# Patient Record
Sex: FEMALE | Race: WHITE
[De-identification: names, ages, dates, MRNs, and addresses within clinical notes are randomized per-mention and may not be internally consistent; named-entity substitution may affect disease eponyms.]

---

## 2017-12-28 ENCOUNTER — HOSPITAL ENCOUNTER (INPATIENT)
Dept: HOSPITAL 63 - ER | Age: 67
LOS: 5 days | Discharge: NURSING, REHAB OR LONG-TERM CARE | DRG: 563 | End: 2018-01-02
Attending: FAMILY MEDICINE | Admitting: FAMILY MEDICINE
Payer: MEDICARE

## 2017-12-28 VITALS — WEIGHT: 203 LBS | HEIGHT: 63 IN | BODY MASS INDEX: 35.97 KG/M2

## 2017-12-28 VITALS — SYSTOLIC BLOOD PRESSURE: 95 MMHG | DIASTOLIC BLOOD PRESSURE: 61 MMHG

## 2017-12-28 VITALS — DIASTOLIC BLOOD PRESSURE: 74 MMHG | SYSTOLIC BLOOD PRESSURE: 140 MMHG

## 2017-12-28 VITALS — SYSTOLIC BLOOD PRESSURE: 147 MMHG | DIASTOLIC BLOOD PRESSURE: 89 MMHG

## 2017-12-28 DIAGNOSIS — M19.90: ICD-10-CM

## 2017-12-28 DIAGNOSIS — R55: ICD-10-CM

## 2017-12-28 DIAGNOSIS — E44.0: ICD-10-CM

## 2017-12-28 DIAGNOSIS — Z82.3: ICD-10-CM
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
## 2017-12-28 DIAGNOSIS — Z91.013: ICD-10-CM

## 2017-12-28 DIAGNOSIS — J44.9: ICD-10-CM

## 2017-12-28 DIAGNOSIS — M79.7: ICD-10-CM

## 2017-12-28 DIAGNOSIS — E78.00: ICD-10-CM

## 2017-12-28 DIAGNOSIS — G47.33: ICD-10-CM

## 2017-12-28 DIAGNOSIS — Z91.041: ICD-10-CM

## 2017-12-28 DIAGNOSIS — Z80.9: ICD-10-CM
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
## 2017-12-28 DIAGNOSIS — E66.01: ICD-10-CM

## 2017-12-28 DIAGNOSIS — Z88.4: ICD-10-CM

## 2017-12-28 DIAGNOSIS — I10: ICD-10-CM

## 2017-12-28 DIAGNOSIS — S82.831A: Primary | ICD-10-CM

## 2017-12-28 DIAGNOSIS — Z88.8: ICD-10-CM
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
## 2017-12-28 DIAGNOSIS — D68.51: ICD-10-CM

## 2017-12-28 DIAGNOSIS — Z80.3: ICD-10-CM

## 2017-12-28 LAB
ANION GAP SERPL CALC-SCNC: 9 MMOL/L (ref 6–14)
BASOPHILS # BLD AUTO: 0 X10^3/UL (ref 0–0.2)
BASOPHILS NFR BLD: 0 % (ref 0–3)
CA-I SERPL ISE-MCNC: 24 MG/DL (ref 7–20)
CALCIUM SERPL-MCNC: 8.4 MG/DL (ref 8.5–10.1)
CHLORIDE SERPL-SCNC: 103 MMOL/L (ref 98–107)
CO2 SERPL-SCNC: 25 MMOL/L (ref 21–32)
CREAT SERPL-MCNC: 1.1 MG/DL (ref 0.6–1)
EOSINOPHIL NFR BLD: 0 % (ref 0–3)
EOSINOPHIL NFR BLD: 0 X10^3/UL (ref 0–0.7)
ERYTHROCYTE [DISTWIDTH] IN BLOOD BY AUTOMATED COUNT: 15.2 % (ref 11.5–14.5)
GFR SERPLBLD BASED ON 1.73 SQ M-ARVRAT: 49.8 ML/MIN
GLUCOSE SERPL-MCNC: 95 MG/DL (ref 70–99)
HCT VFR BLD CALC: 38.9 % (ref 36–47)
HGB BLD-MCNC: 12.8 G/DL (ref 12–15.5)
LYMPHOCYTES # BLD: 1.1 X10^3/UL (ref 1–4.8)
LYMPHOCYTES NFR BLD AUTO: 16 % (ref 24–48)
MAGNESIUM SERPL-MCNC: 2 MG/DL (ref 1.8–2.4)
MCH RBC QN AUTO: 25 PG (ref 25–35)
MCHC RBC AUTO-ENTMCNC: 33 G/DL (ref 31–37)
MCV RBC AUTO: 77 FL (ref 79–100)
MONO #: 0.7 X10^3/UL (ref 0–1.1)
MONOCYTES NFR BLD: 9 % (ref 0–9)
NEUT #: 5.5 X10^3UL (ref 1.8–7.7)
NEUTROPHILS NFR BLD AUTO: 75 % (ref 31–73)
PLATELET # BLD AUTO: 245 X10^3/UL (ref 140–400)
POTASSIUM SERPL-SCNC: 4.6 MMOL/L (ref 3.5–5.1)
RBC # BLD AUTO: 5.06 X10^6/UL (ref 3.5–5.4)
SODIUM SERPL-SCNC: 137 MMOL/L (ref 136–145)
WBC # BLD AUTO: 7.3 X10^3/UL (ref 4–11)

## 2017-12-28 PROCEDURE — 80061 LIPID PANEL: CPT

## 2017-12-28 PROCEDURE — G0378 HOSPITAL OBSERVATION PER HR: HCPCS

## 2017-12-28 PROCEDURE — 80177 DRUG SCRN QUAN LEVETIRACETAM: CPT

## 2017-12-28 PROCEDURE — 84443 ASSAY THYROID STIM HORMONE: CPT

## 2017-12-28 PROCEDURE — 93005 ELECTROCARDIOGRAM TRACING: CPT

## 2017-12-28 PROCEDURE — 80048 BASIC METABOLIC PNL TOTAL CA: CPT

## 2017-12-28 PROCEDURE — 93880 EXTRACRANIAL BILAT STUDY: CPT

## 2017-12-28 PROCEDURE — 90732 PPSV23 VACC 2 YRS+ SUBQ/IM: CPT

## 2017-12-28 PROCEDURE — 73610 X-RAY EXAM OF ANKLE: CPT

## 2017-12-28 PROCEDURE — 81001 URINALYSIS AUTO W/SCOPE: CPT

## 2017-12-28 PROCEDURE — 70450 CT HEAD/BRAIN W/O DYE: CPT

## 2017-12-28 PROCEDURE — 87086 URINE CULTURE/COLONY COUNT: CPT

## 2017-12-28 PROCEDURE — 90686 IIV4 VACC NO PRSV 0.5 ML IM: CPT

## 2017-12-28 PROCEDURE — G0379 DIRECT REFER HOSPITAL OBSERV: HCPCS

## 2017-12-28 PROCEDURE — 85025 COMPLETE CBC W/AUTO DIFF WBC: CPT

## 2017-12-28 PROCEDURE — 83735 ASSAY OF MAGNESIUM: CPT

## 2017-12-28 PROCEDURE — 93306 TTE W/DOPPLER COMPLETE: CPT

## 2017-12-28 PROCEDURE — 29515 APPLICATION SHORT LEG SPLINT: CPT

## 2017-12-28 PROCEDURE — 71010: CPT

## 2017-12-28 PROCEDURE — 36415 COLL VENOUS BLD VENIPUNCTURE: CPT

## 2017-12-28 RX ADMIN — DICLOFENAC SODIUM SCH MG: 75 TABLET, DELAYED RELEASE ORAL at 20:14

## 2017-12-28 RX ADMIN — POTASSIUM CHLORIDE SCH MEQ: 1500 TABLET, EXTENDED RELEASE ORAL at 20:13

## 2017-12-28 RX ADMIN — METOPROLOL TARTRATE SCH MG: 50 TABLET, FILM COATED ORAL at 20:12

## 2017-12-28 RX ADMIN — LIDOCAINE SCH PATCH: 50 PATCH CUTANEOUS at 20:16

## 2017-12-28 RX ADMIN — ZOLPIDEM TARTRATE SCH MG: 5 TABLET ORAL at 20:11

## 2017-12-28 NOTE — RAD
Right ankle, 3 views, 12/28/2017:



History: Ankle pain and swelling



The bony structures are demineralized. There is a nondisplaced oblique

fracture of the distal fibula. There is moderate overlying soft tissue

swelling. No other fracture or dislocation is evident.



IMPRESSION: Nondisplaced distal fibular fracture.

## 2017-12-28 NOTE — PHYS DOC
Past History


Past Medical History:  Asthma, High Cholesterol, Other


Past Surgical History:  No Surgical History


Smoking:  Non-smoker


Alcohol Use:  Occasionally


Drug Use:  None





Adult General


Chief Complaint


Chief Complaint:  SYNCOPE





[facility]


HPI





Patient is a 67 year old F who presents with 2 syncopal episodes and right 

ankle pain. Katina states that last night while reaching overhead to get a 

shirt she had a syncopal episode. She injured her right ankle during this fall. 

She is unsure how long she was unconscious. She was able to get to bed. When 

she woke up this morning she stood and used her walker to walk to the bathroom. 

After approximately 4 steps she had a sensation as if she was going to "pass out

". She did go to the ground without injury however she did not feel that she 

lost consciousness at that time. She does have a history of syncopal episodes 

however they are somewhat distant. Currently she has no symptoms other than 

ankle pain. Her ankle pain is worse with movement and palpation. She is unable 

to bear weight. She has no other associated symptoms. She has no other 

exacerbating or alleviating factors.





Review of Systems


Review of Systems





Constitutional: Denies fever or chills []


Eyes: Denies change in visual acuity, redness, or eye pain []


HENT: Denies nasal congestion or sore throat []


Respiratory: Denies cough or shortness of breath []


Cardiovascular: No additional information not addressed in HPI []


GI: Denies abdominal pain, nausea, vomiting, bloody stools or diarrhea []


: Denies dysuria or hematuria []


Musculoskeletal: Denies back pain


Integument: Denies rash or skin lesions []


Neurologic: Denies headache, focal weakness or sensory changes []


Endocrine: Denies polyuria or polydipsia []





All other systems were reviewed and found to be within normal limits, except as 

documented in this note.





Family History


Family History


No pertinent family medical history reported





Current Medications


Current Medications


Current medications were reviewed





Allergies


Allergies





Allergies








Coded Allergies Type Severity Reaction Last Updated Verified


 


  Iodinated Contrast Media - IV Dye Allergy Severe  5/24/14 Yes


 


  iodine Allergy Severe Anaphylaxis 5/24/14 Yes


 


  vancomycin Allergy Severe  5/24/14 Yes











Physical Exam


Physical Exam





Constitutional: Well developed, well nourished, no acute distress, non-toxic 

appearance. []


HENT: Normocephalic, atraumatic, bilateral external ears normal, oropharynx 

moist, no oral exudates, nose normal. []


Eyes:  EOMI, conjunctiva normal, no discharge. [] 


Neck: Normal range of motion, no tenderness, supple, no stridor. [] 


Cardiovascular:Heart rate regular rhythm, no murmur []


Lungs & Thorax:  Bilateral breath sounds clear to auscultation []


Abdomen: Bowel sounds normal, soft, no tenderness, no masses, no pulsatile 

masses. [] 


Skin: Warm, dry, no erythema, no rash. [] 


Extremities: Diffuse right ankle pain particularly on the lateral malleolus. 

Range of motion limited due to pain. Strength testing limited due to pain.


Neurologic: Alert and oriented X 3, normal motor function, normal sensory 

function, no focal deficits noted. []


Psychologic: Affect normal, judgement normal, mood normal. []





Current Patient Data


Vital Signs





 Vital Signs








  Date Time  Temp Pulse Resp B/P (MAP) Pulse Ox O2 Delivery O2 Flow Rate FiO2


 


12/28/17 11:16 98.2 78 18 126/80 (95) 99 Room Air  








Lab Results





 Laboratory Tests








Test


  12/28/17


10:17


 


White Blood Count


  7.3 x10^3/uL


(4.0-11.0)


 


Red Blood Count


  5.06 x10^6/uL


(3.50-5.40)


 


Hemoglobin


  12.8 g/dL


(12.0-15.5)


 


Hematocrit


  38.9 %


(36.0-47.0)


 


Mean Corpuscular Volume


  77 fL ()


L


 


Mean Corpuscular Hemoglobin 25 pg (25-35)  


 


Mean Corpuscular Hemoglobin


Concent 33 g/dL


(31-37)


 


Red Cell Distribution Width


  15.2 %


(11.5-14.5)  H


 


Platelet Count


  245 x10^3/uL


(140-400)


 


Neutrophils (%) (Auto) 75 % (31-73)  H


 


Lymphocytes (%) (Auto) 16 % (24-48)  L


 


Monocytes (%) (Auto) 9 % (0-9)  


 


Eosinophils (%) (Auto) 0 % (0-3)  


 


Basophils (%) (Auto) 0 % (0-3)  


 


Neutrophils # (Auto)


  5.5 x10^3uL


(1.8-7.7)


 


Lymphocytes # (Auto)


  1.1 x10^3/uL


(1.0-4.8)


 


Monocytes # (Auto)


  0.7 x10^3/uL


(0.0-1.1)


 


Eosinophils # (Auto)


  0.0 x10^3/uL


(0.0-0.7)


 


Basophils # (Auto)


  0.0 x10^3/uL


(0.0-0.2)


 


Sodium Level


  137 mmol/L


(136-145)


 


Potassium Level


  4.6 mmol/L


(3.5-5.1)


 


Chloride Level


  103 mmol/L


()


 


Carbon Dioxide Level


  25 mmol/L


(21-32)


 


Anion Gap 9 (6-14)  


 


Blood Urea Nitrogen


  24 mg/dL


(7-20)  H


 


Creatinine


  1.1 mg/dL


(0.6-1.0)  H


 


Estimated GFR


(Cockcroft-Gault) 49.8  


 


 


Glucose Level


  95 mg/dL


(70-99)


 


Calcium Level


  8.4 mg/dL


(8.5-10.1)  L


 


Magnesium Level


  2.0 mg/dL


(1.8-2.4)











EKG


EKG


Normal sinus rhythm, normal QRS interval, no ST segment changes  -  Oleg





Radiology/Procedures


Radiology/Procedures


R ankle xray -  oblique distal fibular fracture without displacement





Course & Med Decision Making


Course & Med Decision Making


Pertinent Labs and Imaging studies reviewed. (See chart for details)





Katina is unable to bear weight due to pain that causes syncope. She lives with 

her  however he is unable to help due to his on chronic medical 

conditions. Orthopedics was contacted by phone and recommended placement in a 

cam walker boot with weightbearing as tolerated. Dr. Hernandez was contacted by 

phone.  He recommended admission for further evaluation and management





Dragon Disclaimer


Dragon Disclaimer


This electronic medical record was generated, in whole or in part, using a 

voice recognition dictation system.





Departure


Departure:


Impression:  


 Primary Impression:  


 Syncope


 Additional Impression:  


 Fracture of distal end of right fibula


Disposition:  09 ADMITTED AS INPATIENT


Condition:  STABLE


Referrals:  


KELLY HERNANDEZ MD (PCP)





Problem Qualifiers








 Primary Impression:  


 Syncope


 Syncope type:  unspecified  Qualified Codes:  R55 - Syncope and collapse


 Additional Impression:  


 Fracture of distal end of right fibula


 Encounter type:  initial encounter  Fracture type:  closed  Fracture morphology

:  unspecified fracture morphology  Qualified Codes:  S82.831A - Other fracture 

of upper and lower end of right fibula, initial encounter for closed fracture








KELLY SAMANIEGO MD Dec 28, 2017 12:35

## 2017-12-29 VITALS — SYSTOLIC BLOOD PRESSURE: 87 MMHG | DIASTOLIC BLOOD PRESSURE: 62 MMHG

## 2017-12-29 VITALS — DIASTOLIC BLOOD PRESSURE: 79 MMHG | SYSTOLIC BLOOD PRESSURE: 113 MMHG

## 2017-12-29 VITALS — SYSTOLIC BLOOD PRESSURE: 97 MMHG | DIASTOLIC BLOOD PRESSURE: 59 MMHG

## 2017-12-29 VITALS — SYSTOLIC BLOOD PRESSURE: 102 MMHG | DIASTOLIC BLOOD PRESSURE: 56 MMHG

## 2017-12-29 VITALS — DIASTOLIC BLOOD PRESSURE: 70 MMHG | SYSTOLIC BLOOD PRESSURE: 150 MMHG

## 2017-12-29 VITALS — DIASTOLIC BLOOD PRESSURE: 55 MMHG | SYSTOLIC BLOOD PRESSURE: 96 MMHG

## 2017-12-29 RX ADMIN — ZOLPIDEM TARTRATE SCH MG: 5 TABLET ORAL at 20:39

## 2017-12-29 RX ADMIN — CEFTRIAXONE SODIUM SCH GM: 1 INJECTION, POWDER, FOR SOLUTION INTRAMUSCULAR; INTRAVENOUS at 12:59

## 2017-12-29 RX ADMIN — DICLOFENAC SODIUM SCH MG: 75 TABLET, DELAYED RELEASE ORAL at 20:38

## 2017-12-29 RX ADMIN — Medication SCH MG: at 09:17

## 2017-12-29 RX ADMIN — POTASSIUM CHLORIDE SCH MEQ: 1500 TABLET, EXTENDED RELEASE ORAL at 20:39

## 2017-12-29 RX ADMIN — LIDOCAINE SCH PATCH: 50 PATCH CUTANEOUS at 09:22

## 2017-12-29 RX ADMIN — Medication SCH CAP: at 20:39

## 2017-12-29 RX ADMIN — VITAMIN D, TAB 1000IU (100/BT) SCH UNIT: 25 TAB at 09:21

## 2017-12-29 RX ADMIN — NIACIN SCH MG: 500 TABLET, FILM COATED, EXTENDED RELEASE ORAL at 09:21

## 2017-12-29 RX ADMIN — METOPROLOL TARTRATE SCH MG: 50 TABLET, FILM COATED ORAL at 09:18

## 2017-12-29 RX ADMIN — CITALOPRAM HYDROBROMIDE SCH MG: 20 TABLET ORAL at 10:12

## 2017-12-29 RX ADMIN — DICLOFENAC SODIUM SCH MG: 75 TABLET, DELAYED RELEASE ORAL at 09:21

## 2017-12-29 RX ADMIN — Medication SCH CAP: at 12:59

## 2017-12-29 RX ADMIN — METOPROLOL TARTRATE SCH MG: 50 TABLET, FILM COATED ORAL at 20:40

## 2017-12-29 RX ADMIN — POTASSIUM CHLORIDE SCH MEQ: 1500 TABLET, EXTENDED RELEASE ORAL at 09:17

## 2017-12-29 RX ADMIN — CHOLESTYRAMINE SCH GM: 4 POWDER, FOR SUSPENSION ORAL at 09:21

## 2017-12-29 NOTE — PDOC2
CONSULT


Date of Admission


DATE: 12/29/17 


TIME: 14:38


Reason for Consult:


syncope


Referring Physician:


Dr. Hernandez


Chief Complaint


syncope


Source:  Patient


Problem List


Problems


Medical Problems:


(1) Fracture of distal end of right fibula


Status: Acute  





(2) Syncope


Status: Acute  








History of Present Illness


The patient is a 67-year-old female who was admitted through the emergency room 

after an episode of syncope and a fall. She states that she was reaching to an 

upper level shelf to obtain a item became lightheadedness and fell. She is 

uncertain how long she was unconscious and there were no witnesses. When she 

awoke she reports pain in her right foot but no other symptoms. She states a 

second episode of near syncope followed this. She states that she's had these 

problems for over 10 years. She reports being followed at . She reports a 

history of a normal tilt table and a normal echocardiogram. These episodes 

usually occur when reaching above her head. She is now comfortable. Her EKG 

shows no ischemic changes. Telemetry throughout her hospitalization has shown a 

normal sinus rhythm. X-ray shows a nondisplaced distal fibular fracture. Chest x

-ray shows no acute changes. CT head scan shows no acute changes.


Cardiovascular:  HTN, syncope, hyperipidemia


Pulmonary:  Asthma


Past Surgical History:  No pertinent history


Family History:  Other (no family history of arrhythmias.)


Smoke:  No


ALCOHOL:  occassional


Current Medications





Current Medications


Furosemide (Lasix) 40 mg WEEKLY PO ;  Start 1/4/18 at 09:00


Levetiracetam (Keppra) 125 mg HS PO ;  Start 12/28/17 at 21:00;  Stop 12/28/17 

at 21:00;  Status DC


Levetiracetam (Keppra) 250 mg DAILY08 PO ;  Start 12/29/17 at 08:00;  Stop 12/29 /17 at 08:00;  Status DC


Lidocaine (Lidoderm) 1 patch DAILY TD  Last administered on 12/29/17at 09:22;  

Start 12/28/17 at 18:30


Metoprolol Tartrate (Lopressor) 50 mg BID PO  Last administered on 12/29/17at 09

:18;  Start 12/28/17 at 21:00


Zolpidem Tartrate (Ambien) 5 mg HS PO  Last administered on 12/28/17at 20:11;  

Start 12/28/17 at 21:00


Niacin (Slo-Niacin) 500 mg DAILY PO  Last administered on 12/29/17at 09:21;  

Start 12/29/17 at 09:00


Fish Oil (Fish Oil) 2,000 mg DAILY PO  Last administered on 12/29/17at 09:17;  

Start 12/29/17 at 09:00


Ibuprofen (Motrin) 600 mg TID PO  Last administered on 12/28/17at 20:13;  Start 

12/28/17 at 21:00;  Stop 12/29/17 at 09:40;  Status DC


Info (FLU VACCINE per PROTOCOL) 1 ea PRN 1X  PRN MC PER PROTOCOL;  Start 12/28/ 17 at 19:30;  Status UNV


Pneumococcal Polyvalent Vaccine (Pneumovax 23) 0.5 ml ONCE ONCE VAX IM  Last 

administered on 12/29/17at 09:34;  Start 12/29/17 at 09:00;  Stop 12/29/17 at 09

:01;  Status DC


Influenza Virus Vaccine Quadrival (Fluarix Quad [id number] Syringe) 0.5 ml ONCE 

ONCE VAX IM  Last administered on 12/29/17at 09:37;  Start 12/29/17 at 09:00;  

Stop 12/29/17 at 09:01;  Status DC


Cholestyramine Resin (Questran Light) 4 gm DAILY PO  Last administered on 12/29/ 17at 09:21;  Start 12/29/17 at 09:00


Diclofenac Sodium (Voltaren) 75 mg BID PO  Last administered on 12/29/17at 09:21

;  Start 12/28/17 at 21:00


Levetiracetam (Keppra) 500 mg BID PO  Last administered on 12/29/17at 09:17;  

Start 12/28/17 at 21:00


Potassium Chloride (Klor-Con) 20 meq BID PO  Last administered on 12/29/17at 09:

17;  Start 12/28/17 at 21:00


Vitamin D (Vitamin D3) 1,000 unit DAILY PO  Last administered on 12/29/17at 09:

21;  Start 12/29/17 at 09:00


Ceftriaxone Sodium 1 gm/ Sodium Chloride 50 ml @  100 mls/hr Q24H IV ;  Start 12 /29/17 at 09:45;  Stop 12/29/17 at 09:50;  Status DC


Citalopram Hydrobromide (CeleXA) 20 mg DAILY PO  Last administered on 12/29/ 17at 10:12;  Start 12/29/17 at 10:00


Ceftriaxone Sodium (Rocephin) 1 gm Q24H IVP  Last administered on 12/29/17at 12:

59;  Start 12/29/17 at 10:00


Lactobacillus Rhamnosus (Culturelle) 1 cap BID PO  Last administered on 12/29/ 17at 12:59;  Start 12/29/17 at 12:00





Active Scripts


Active


Reported


Vitamin D3 (Cholecalciferol (Vitamin D3)) 1,000 Unit Capsule 1,000 Unit PO DAILY


Prevalite Packet (Cholestyramine/Aspartame) 4 Gm Powd.pack 4 Gm PO DAILY


Klor-Con M20 (Potassium Chloride) 20 Meq Tab.er.prt 20 Meq PO BID


Keppra (Levetiracetam) 500 Mg Tablet 500 Mg PO BID


Diclofenac Sodium 75 Mg Tablet.dr 75 Mg PO BID


Daypro (Oxaprozin) 600 Mg Tablet 600 Mg PO DAILY


     Indication: Arthritis


     Last Dose: Unknown


     Next Dose: any time


Lasix (Furosemide) 40 Mg Tablet 40 Mg PO WEEKLY


     Indication: Diuretic


     Next dose: one time weekly


Lidoderm (Lidocaine) 700 Mg Adh..patch   


     Indication: pain patch


     Last Dose: 05/25/14


     Next Dose: 05/26/14


Metoprolol Tartrate 50 Mg Tablet 1 Tab PO BID


     Indication: Blood pressure


     Last dose: 05/25/14 in AM


     Next Dose: 05/25 at bedtime


Lovastatin 20 Mg Tablet   


Niacin 500 Mg Tablet 1 Tab PO DAILY


     Indication:


     Last Dose: 05/25/14


     Next Dose: 05/26/14


Fish Oil + Vitamin D-3 Softgel (Om-3/Dha/Epa/Fish Oil/Vit D3) 1 Each Capsule 2 

Tab PO DAILY


     Indication: Heart health


     Last Dose: 05/25/14


     Next Dose: 05/26/14 in AM


     


Ambien (Zolpidem Tartrate) 5 Mg Tablet 1 Tab PO HS


     Indication: sleep med


     Last Dose: 05/24/14 at bedtime


     Next Dose: 05/25/14 at betime


     


Advair 250-50 Diskus (Fluticasone/Salmeterol) 1 Each Disk.w.dev   PRN BID


     Indication: COPD


     Last Dose: unknonw


     Next Dose: anytime as needed twice daily.


Allergies:  


Coded Allergies:  


     Iodinated Contrast- Oral and IV Dye (Verified  Allergy, Severe, 5/24/14)


     iodine (Verified  Allergy, Severe, Anaphylaxis, 5/24/14)


     vancomycin (Verified  Allergy, Severe, 5/24/14)


     bacitracin (Verified  Allergy, Intermediate, 12/28/17)


     levofloxacin (Verified  Allergy, Intermediate, 12/28/17)


     shellfish derived (Verified  Allergy, Intermediate, 12/28/17)


General:  YES: Fatigue


Cardiovascular:  yes: Other (syncope and near syncope)


General:  No acute distress


HEENT:  Atraumatic


Lungs:  Clear to auscultation


Heart:  Regular rate


Abdomen:  Normal bowel sounds


VITALS





Vital Signs








  Date Time  Temp Pulse Resp B/P (MAP) Pulse Ox O2 Delivery O2 Flow Rate FiO2


 


12/29/17 11:12 97.7 78 20 87/62 (70) 92   


 


12/29/17 08:29      Room Air  








Labs





Laboratory Tests








Test


  12/28/17


10:17 12/29/17


09:50


 


White Blood Count


  7.3 x10^3/uL


(4.0-11.0) 


 


 


Red Blood Count


  5.06 x10^6/uL


(3.50-5.40) 


 


 


Hemoglobin


  12.8 g/dL


(12.0-15.5) 


 


 


Hematocrit


  38.9 %


(36.0-47.0) 


 


 


Mean Corpuscular Volume 77 fL ()  


 


Mean Corpuscular Hemoglobin 25 pg (25-35)  


 


Mean Corpuscular Hemoglobin


Concent 33 g/dL


(31-37) 


 


 


Red Cell Distribution Width


  15.2 %


(11.5-14.5) 


 


 


Platelet Count


  245 x10^3/uL


(140-400) 


 


 


Neutrophils (%) (Auto) 75 % (31-73)  


 


Lymphocytes (%) (Auto) 16 % (24-48)  


 


Monocytes (%) (Auto) 9 % (0-9)  


 


Eosinophils (%) (Auto) 0 % (0-3)  


 


Basophils (%) (Auto) 0 % (0-3)  


 


Neutrophils # (Auto)


  5.5 x10^3uL


(1.8-7.7) 


 


 


Lymphocytes # (Auto)


  1.1 x10^3/uL


(1.0-4.8) 


 


 


Monocytes # (Auto)


  0.7 x10^3/uL


(0.0-1.1) 


 


 


Eosinophils # (Auto)


  0.0 x10^3/uL


(0.0-0.7) 


 


 


Basophils # (Auto)


  0.0 x10^3/uL


(0.0-0.2) 


 


 


Sodium Level


  137 mmol/L


(136-145) 


 


 


Potassium Level


  4.6 mmol/L


(3.5-5.1) 


 


 


Chloride Level


  103 mmol/L


() 


 


 


Carbon Dioxide Level


  25 mmol/L


(21-32) 


 


 


Anion Gap 9 (6-14)  


 


Blood Urea Nitrogen


  24 mg/dL


(7-20) 


 


 


Creatinine


  1.1 mg/dL


(0.6-1.0) 


 


 


Estimated GFR


(Cockcroft-Gault) 49.8 


  


 


 


Glucose Level


  95 mg/dL


(70-99) 


 


 


Calcium Level


  8.4 mg/dL


(8.5-10.1) 


 


 


Magnesium Level


  2.0 mg/dL


(1.8-2.4) 2.0 mg/dL


(1.8-2.4)








Images


Chest x-ray shows no acute changes.


CT head scan shows no acute changes.


X-ray of the patient's leg shows a nondisplaced distal fibular fracture


Assessment/Plan


1. Syncope. The patient's rhythm has been stable since admission. EKG shows no 

evidence of ischemia. As noted above the patient reports that previous fairly 

extensive workup at  for syncope. She is on multiple medications that suggest 

other possible cardiac or neurological problems. At the present time she is 

comfortable and stable in her bed. We'll continue present treatments and have 

requested old records from . Echocardiogram has been requested. Carotid 

ultrasound has been requested. Outpatient monitoring appears reasonable. The 

patient states she has a follow up at  in the next one to 2 months.





2. Reported hypertension. At this time would continue to monitor and continue 

present medications.





3. Nondisplaced distal fibular fracture. The patient has been placed in a 

support boot.





4. Hyperlipidemia. We'll check lab.





5. History of asthma. No wheezing on examination.





Thank you for allowing us to participate in the care of your patient.


Problems:  











ABBI SERNA MD Dec 29, 2017 14:48

## 2017-12-29 NOTE — EKG
[facility] [address], [place] [address]

Test Date:    2017               Test Time:    09:56:17

Pat Name:     ISSA RODRIGUEZ             Department:   

Patient ID:   [id number]           Room:         122 A

Gender:       F                        Technician:   VICENTE

:          1950               Requested By: KELLY SAMANIEGO

Order Number: [id number]            Reading MD:   Lobo Middleton

                                 Measurements

Intervals                              Axis          

Rate:         78                       P:            53

DC:           160                      QRS:          26

QRSD:         72                       T:            42

QT:           376                                    

QTc:          432                                    

                           Interpretive Statements

SINUS RHYTHM

NORMAL ECG



Electronically Signed On 2018 9:09:27 CST by Lobo Middleton

## 2017-12-29 NOTE — RAD
MR#: [id number]

Account#: [id number]

Accession#: [id number]

Date of Study: 12/29/2017

Ordering Physician: JUVENTINO KUO,

Referring Physician: KELLY TRUJILLO,

Tech: Anjali Carmichael RDMS, NAVIT, RTR





--------------- APPROVED REPORT --------------





Patient Location: IN-PATIENT

Laterality:Bilateral



Indications

Syncope

Gray scale images of the bilateral CCA, ICA and ECA do not reveal any signficant thrombus



Spectral waveforms and color doppler evaluation reveals no high grade stenosis. Normal ICA to CCA rat
ios. 



Critical Notification

Critical Value: No



<Conclusion>

No significant VIC. 

Normal antegrade vertebral velocities. 



Signed by : Darrius Ochoa, 

Electronically Approved : 12/29/2017 14:33:36

## 2017-12-29 NOTE — RAD
CT of the head without contrast, 12/29/2017:



History: Possible seizure



Comparison is made to a study from 6/4/2011. There is moderate cerebral

atrophy. The ventricles are within normal limits in size. There is no shift of

the midline structures. There is no evidence of acute intracranial hemorrhage

or mass effect. There is calcific plaquing of the distal internal carotid

arteries.



IMPRESSION: No acute intracranial abnormality is detected.











PQRS Compliance Statement:



One or more of the following individualized dose reduction techniques were

utilized for this examination:

1. Automated exposure control

2. Adjustment of the mA and/or kV according to patient size

3. Use of iterative reconstruction technique

## 2017-12-29 NOTE — RAD
Portable chest, 12/29/2017:



History: Fever



Comparison is made to a study from 9/26/2012. The heart size and pulmonary

vascularity are normal. There is mild calcific plaquing and tortuosity of the

thoracic aorta. No pulmonary infiltrates are seen. There is no evidence of

pleural fluid.



IMPRESSION: No acute cardiopulmonary abnormality is detected.

## 2017-12-30 VITALS — SYSTOLIC BLOOD PRESSURE: 70 MMHG | DIASTOLIC BLOOD PRESSURE: 53 MMHG

## 2017-12-30 VITALS — SYSTOLIC BLOOD PRESSURE: 116 MMHG | DIASTOLIC BLOOD PRESSURE: 76 MMHG

## 2017-12-30 VITALS — SYSTOLIC BLOOD PRESSURE: 116 MMHG | DIASTOLIC BLOOD PRESSURE: 61 MMHG

## 2017-12-30 VITALS — SYSTOLIC BLOOD PRESSURE: 129 MMHG | DIASTOLIC BLOOD PRESSURE: 54 MMHG

## 2017-12-30 VITALS — SYSTOLIC BLOOD PRESSURE: 114 MMHG | DIASTOLIC BLOOD PRESSURE: 77 MMHG

## 2017-12-30 VITALS — DIASTOLIC BLOOD PRESSURE: 79 MMHG | SYSTOLIC BLOOD PRESSURE: 134 MMHG

## 2017-12-30 VITALS — SYSTOLIC BLOOD PRESSURE: 129 MMHG | DIASTOLIC BLOOD PRESSURE: 79 MMHG

## 2017-12-30 LAB
APTT PPP: YELLOW S
BACTERIA #/AREA URNS HPF: (no result) /HPF
BILIRUB UR QL STRIP: (no result)
CHOLEST SERPL-MCNC: 159 MG/DL (ref 0–200)
CHOLEST/HDLC SERPL: 3 {RATIO}
FIBRINOGEN PPP-MCNC: (no result) MG/DL
GLUCOSE UR STRIP-MCNC: (no result) MG/DL
HDLC SERPL-MCNC: 40 MG/DL (ref 40–60)
HYALINE CASTS #/AREA URNS LPF: (no result) /HPF
LDLC: 87 MG/DL (ref 0–100)
NITRITE UR QL STRIP: (no result)
RBC #/AREA URNS HPF: (no result) /HPF (ref 0–2)
SP GR UR STRIP: 1.01
SQUAMOUS #/AREA URNS LPF: (no result) /LPF
THYROID STIM HORMONE (TSH): 3.18 UIU/ML (ref 0.36–3.74)
TRIGL SERPL-MCNC: 164 MG/DL (ref 0–150)
UROBILINOGEN UR-MCNC: 0.2 MG/DL
VLDLC: 32 MG/DL (ref 0–40)
WBC #/AREA URNS HPF: (no result) /HPF (ref 0–4)
YEAST #/AREA URNS HPF: PRESENT /HPF

## 2017-12-30 RX ADMIN — POTASSIUM CHLORIDE SCH MEQ: 1500 TABLET, EXTENDED RELEASE ORAL at 20:25

## 2017-12-30 RX ADMIN — Medication SCH CAP: at 08:49

## 2017-12-30 RX ADMIN — Medication SCH CAP: at 20:24

## 2017-12-30 RX ADMIN — Medication SCH MG: at 08:46

## 2017-12-30 RX ADMIN — CHOLESTYRAMINE SCH GM: 4 POWDER, FOR SUSPENSION ORAL at 08:47

## 2017-12-30 RX ADMIN — CEFTRIAXONE SODIUM SCH GM: 1 INJECTION, POWDER, FOR SOLUTION INTRAMUSCULAR; INTRAVENOUS at 08:56

## 2017-12-30 RX ADMIN — VITAMIN D, TAB 1000IU (100/BT) SCH UNIT: 25 TAB at 08:45

## 2017-12-30 RX ADMIN — METOPROLOL TARTRATE SCH MG: 50 TABLET, FILM COATED ORAL at 20:25

## 2017-12-30 RX ADMIN — DICLOFENAC SODIUM SCH MG: 75 TABLET, DELAYED RELEASE ORAL at 20:26

## 2017-12-30 RX ADMIN — CITALOPRAM HYDROBROMIDE SCH MG: 20 TABLET ORAL at 08:46

## 2017-12-30 RX ADMIN — DICLOFENAC SODIUM SCH MG: 75 TABLET, DELAYED RELEASE ORAL at 08:50

## 2017-12-30 RX ADMIN — ZOLPIDEM TARTRATE SCH MG: 5 TABLET ORAL at 20:24

## 2017-12-30 RX ADMIN — METOPROLOL TARTRATE SCH MG: 50 TABLET, FILM COATED ORAL at 08:47

## 2017-12-30 RX ADMIN — NIACIN SCH MG: 500 TABLET, FILM COATED, EXTENDED RELEASE ORAL at 08:46

## 2017-12-30 RX ADMIN — POTASSIUM CHLORIDE SCH MEQ: 1500 TABLET, EXTENDED RELEASE ORAL at 08:47

## 2017-12-30 RX ADMIN — LIDOCAINE SCH PATCH: 50 PATCH CUTANEOUS at 08:53

## 2017-12-31 VITALS — DIASTOLIC BLOOD PRESSURE: 85 MMHG | SYSTOLIC BLOOD PRESSURE: 143 MMHG

## 2017-12-31 VITALS — SYSTOLIC BLOOD PRESSURE: 142 MMHG | DIASTOLIC BLOOD PRESSURE: 84 MMHG

## 2017-12-31 VITALS — SYSTOLIC BLOOD PRESSURE: 121 MMHG | DIASTOLIC BLOOD PRESSURE: 62 MMHG

## 2017-12-31 VITALS — DIASTOLIC BLOOD PRESSURE: 78 MMHG | SYSTOLIC BLOOD PRESSURE: 151 MMHG

## 2017-12-31 VITALS — DIASTOLIC BLOOD PRESSURE: 56 MMHG | SYSTOLIC BLOOD PRESSURE: 91 MMHG

## 2017-12-31 LAB
ANION GAP SERPL CALC-SCNC: 10 MMOL/L (ref 6–14)
BASOPHILS # BLD AUTO: 0 X10^3/UL (ref 0–0.2)
BASOPHILS NFR BLD: 0 % (ref 0–3)
CA-I SERPL ISE-MCNC: 21 MG/DL (ref 7–20)
CALCIUM SERPL-MCNC: 8.1 MG/DL (ref 8.5–10.1)
CHLORIDE SERPL-SCNC: 106 MMOL/L (ref 98–107)
CO2 SERPL-SCNC: 23 MMOL/L (ref 21–32)
CREAT SERPL-MCNC: 0.8 MG/DL (ref 0.6–1)
EOSINOPHIL NFR BLD: 0.1 X10^3/UL (ref 0–0.7)
EOSINOPHIL NFR BLD: 3 % (ref 0–3)
ERYTHROCYTE [DISTWIDTH] IN BLOOD BY AUTOMATED COUNT: 15.1 % (ref 11.5–14.5)
GFR SERPLBLD BASED ON 1.73 SQ M-ARVRAT: 72 ML/MIN
GLUCOSE SERPL-MCNC: 97 MG/DL (ref 70–99)
HCT VFR BLD CALC: 36.1 % (ref 36–47)
HGB BLD-MCNC: 12.1 G/DL (ref 12–15.5)
LYMPHOCYTES # BLD: 1.7 X10^3/UL (ref 1–4.8)
LYMPHOCYTES NFR BLD AUTO: 37 % (ref 24–48)
MCH RBC QN AUTO: 26 PG (ref 25–35)
MCHC RBC AUTO-ENTMCNC: 34 G/DL (ref 31–37)
MCV RBC AUTO: 76 FL (ref 79–100)
MONO #: 0.4 X10^3/UL (ref 0–1.1)
MONOCYTES NFR BLD: 8 % (ref 0–9)
NEUT #: 2.4 X10^3UL (ref 1.8–7.7)
NEUTROPHILS NFR BLD AUTO: 51 % (ref 31–73)
PLATELET # BLD AUTO: 227 X10^3/UL (ref 140–400)
POTASSIUM SERPL-SCNC: 4.6 MMOL/L (ref 3.5–5.1)
RBC # BLD AUTO: 4.76 X10^6/UL (ref 3.5–5.4)
SODIUM SERPL-SCNC: 139 MMOL/L (ref 136–145)
WBC # BLD AUTO: 4.6 X10^3/UL (ref 4–11)

## 2017-12-31 RX ADMIN — MIDODRINE HYDROCHLORIDE SCH MG: 2.5 TABLET ORAL at 13:00

## 2017-12-31 RX ADMIN — ZOLPIDEM TARTRATE SCH MG: 5 TABLET ORAL at 20:31

## 2017-12-31 RX ADMIN — CITALOPRAM HYDROBROMIDE SCH MG: 20 TABLET ORAL at 08:24

## 2017-12-31 RX ADMIN — METOPROLOL TARTRATE SCH MG: 50 TABLET, FILM COATED ORAL at 09:00

## 2017-12-31 RX ADMIN — LIDOCAINE SCH PATCH: 50 PATCH CUTANEOUS at 08:25

## 2017-12-31 RX ADMIN — MIDODRINE HYDROCHLORIDE SCH MG: 2.5 TABLET ORAL at 13:20

## 2017-12-31 RX ADMIN — MIDODRINE HYDROCHLORIDE SCH MG: 2.5 TABLET ORAL at 15:08

## 2017-12-31 RX ADMIN — NIACIN SCH MG: 500 TABLET, FILM COATED, EXTENDED RELEASE ORAL at 08:24

## 2017-12-31 RX ADMIN — CITALOPRAM HYDROBROMIDE SCH MG: 20 TABLET ORAL at 09:00

## 2017-12-31 RX ADMIN — SODIUM CHLORIDE SCH MLS/HR: 0.9 INJECTION, SOLUTION INTRAVENOUS at 12:15

## 2017-12-31 RX ADMIN — POTASSIUM CHLORIDE SCH MEQ: 1500 TABLET, EXTENDED RELEASE ORAL at 08:24

## 2017-12-31 RX ADMIN — CEFTRIAXONE SODIUM SCH GM: 1 INJECTION, POWDER, FOR SOLUTION INTRAMUSCULAR; INTRAVENOUS at 10:43

## 2017-12-31 RX ADMIN — CHOLESTYRAMINE SCH GM: 4 POWDER, FOR SUSPENSION ORAL at 08:25

## 2017-12-31 RX ADMIN — VITAMIN D, TAB 1000IU (100/BT) SCH UNIT: 25 TAB at 08:23

## 2017-12-31 RX ADMIN — DICLOFENAC SODIUM SCH MG: 75 TABLET, DELAYED RELEASE ORAL at 08:25

## 2017-12-31 RX ADMIN — Medication SCH CAP: at 08:24

## 2017-12-31 RX ADMIN — Medication SCH MG: at 08:24

## 2017-12-31 RX ADMIN — DICLOFENAC SODIUM SCH MG: 75 TABLET, DELAYED RELEASE ORAL at 20:32

## 2017-12-31 RX ADMIN — POTASSIUM CHLORIDE SCH MEQ: 1500 TABLET, EXTENDED RELEASE ORAL at 20:31

## 2017-12-31 RX ADMIN — METOPROLOL TARTRATE SCH MG: 50 TABLET, FILM COATED ORAL at 20:32

## 2017-12-31 RX ADMIN — Medication SCH CAP: at 20:30

## 2017-12-31 NOTE — PDOC
SUBJECTIVE:


Patient seen and examined


She reports feeling better.





OBJECTIVE:


Problems:


Problems


Medical Problems:


(1) Fracture of distal end of right fibula


Status: Acute  





(2) Syncope


Status: Acute  





1. Syncope. The patient's rhythm has been stable since admission. EKG shows no 

evidence of ischemia. As noted above the patient reports that previous fairly 

extensive workup at  for syncope. She is on multiple medications that suggest 

other possible cardiac or neurological problems. At the present time she is 

comfortable and stable in her bed. Head CT scan shows no significant changes. 

Carotid ultrasound shows no significant lesions. ECHO has been ordered but has 

been delayed secondary to technical problems. The patient appears clinically 

stable from a cardiac viewpoint. Echocardiogram could be done as an outpatient. 

The patient states she has a follow up at  in the next one to 2 months.





2. Reported hypertension. At this time would continue to monitor and continue 

present medications.





3. Nondisplaced distal fibular fracture. The patient has been placed in a 

support boot.





4. Hyperlipidemia. Continue present medications.





5. History of asthma. No wheezing on examination.


Physical examination.


Chest is clear.


CV. RRR with a I/VI murmur.


Abdomen. Soft without tenderness.


Vital Signs:





Vital Signs








  Date Time  Temp Pulse Resp B/P (MAP) Pulse Ox O2 Delivery O2 Flow Rate FiO2


 


12/31/17 15:10 97.7 82 20 151/78 (102) 96 Room Air  








I & O











Intake and Output 


 


 12/31/17





 07:00


 


Intake Total 800 ml


 


Output Total 400 ml


 


Balance 400 ml


 


 


 


Intake Oral 800 ml


 


Output Urine Total 400 ml


 


# Voids 2


 


# Bowel Movements 2








Labs:





Laboratory Tests








Test


  12/30/17


02:00 12/30/17


15:42 12/31/17


06:52


 


Triglycerides Level


  164 mg/dL


(0-150) 


  


 


 


Cholesterol Level


  159 mg/dL


(0-200) 


  


 


 


LDL Cholesterol, Calculated


  87 mg/dL


(0-100) 


  


 


 


VLDL Cholesterol, Calculated


  32 mg/dL


(0-40) 


  


 


 


Non-HDL Cholesterol Calculated


  119 mg/dL


(0-129) 


  


 


 


HDL Cholesterol


  40 mg/dL


(40-60) 


  


 


 


Cholesterol/HDL Ratio 3.0   


 


Thyroid Stimulating Hormone


(TSH) 3.183 uIU/mL


(0.358-3.740) 


  


 


 


Urine Collection Type  Unknown  


 


Urine Color  Yellow  


 


Urine Clarity  Cloudy  


 


Urine pH  5.0  


 


Urine Specific Gravity  1.015  


 


Urine Protein


  


  30 mg/dl


(NEG-TRACE) 


 


 


Urine Glucose (UA)


  


  Neg mg/dL


(NEG) 


 


 


Urine Ketones (Stick)  15 mg/dL (NEG)  


 


Urine Blood  Neg (NEG)  


 


Urine Nitrite  Neg (NEG)  


 


Urine Bilirubin  Neg (NEG)  


 


Urine Urobilinogen Dipstick


  


  0.2 mg/dL (0.2


mg/dL) 


 


 


Urine Leukocyte Esterase  Small (NEG)  


 


Urine RBC  3-5 /HPF (0-2)  


 


Urine WBC


  


  5-10 /HPF


(0-4) 


 


 


Urine Squamous Epithelial


Cells 


  Many /LPF 


  


 


 


Urine Bacteria


  


  Many /HPF


(0-FEW) 


 


 


Urine Hyaline Casts  Few /HPF  


 


Urine Mucus  Mod /LPF  


 


Urine Yeast  Present /HPF  


 


White Blood Count


  


  


  4.6 x10^3/uL


(4.0-11.0)


 


Red Blood Count


  


  


  4.76 x10^6/uL


(3.50-5.40)


 


Hemoglobin


  


  


  12.1 g/dL


(12.0-15.5)


 


Hematocrit


  


  


  36.1 %


(36.0-47.0)


 


Mean Corpuscular Volume   76 fL () 


 


Mean Corpuscular Hemoglobin   26 pg (25-35) 


 


Mean Corpuscular Hemoglobin


Concent 


  


  34 g/dL


(31-37)


 


Red Cell Distribution Width


  


  


  15.1 %


(11.5-14.5)


 


Platelet Count


  


  


  227 x10^3/uL


(140-400)


 


Neutrophils (%) (Auto)   51 % (31-73) 


 


Lymphocytes (%) (Auto)   37 % (24-48) 


 


Monocytes (%) (Auto)   8 % (0-9) 


 


Eosinophils (%) (Auto)   3 % (0-3) 


 


Basophils (%) (Auto)   0 % (0-3) 


 


Neutrophils # (Auto)


  


  


  2.4 x10^3uL


(1.8-7.7)


 


Lymphocytes # (Auto)


  


  


  1.7 x10^3/uL


(1.0-4.8)


 


Monocytes # (Auto)


  


  


  0.4 x10^3/uL


(0.0-1.1)


 


Eosinophils # (Auto)


  


  


  0.1 x10^3/uL


(0.0-0.7)


 


Basophils # (Auto)


  


  


  0.0 x10^3/uL


(0.0-0.2)


 


Sodium Level


  


  


  139 mmol/L


(136-145)


 


Potassium Level


  


  


  4.6 mmol/L


(3.5-5.1)


 


Chloride Level


  


  


  106 mmol/L


()


 


Carbon Dioxide Level


  


  


  23 mmol/L


(21-32)


 


Anion Gap   10 (6-14) 


 


Blood Urea Nitrogen


  


  


  21 mg/dL


(7-20)


 


Creatinine


  


  


  0.8 mg/dL


(0.6-1.0)


 


Estimated GFR


(Cockcroft-Gault) 


  


  72.0 


 


 


Glucose Level


  


  


  97 mg/dL


(70-99)


 


Calcium Level


  


  


  8.1 mg/dL


(8.5-10.1)








Physical Exam:


Chest: clear.


CV: RRR with a I/VI murmur.


Abdomen:  soft without tenderness.





ASSESSMENT:


As above.











ABBI SERNA MD Dec 31, 2017 16:16

## 2018-01-01 VITALS — DIASTOLIC BLOOD PRESSURE: 61 MMHG | SYSTOLIC BLOOD PRESSURE: 105 MMHG

## 2018-01-01 VITALS — DIASTOLIC BLOOD PRESSURE: 80 MMHG | SYSTOLIC BLOOD PRESSURE: 132 MMHG

## 2018-01-01 VITALS — DIASTOLIC BLOOD PRESSURE: 92 MMHG | SYSTOLIC BLOOD PRESSURE: 183 MMHG

## 2018-01-01 VITALS — DIASTOLIC BLOOD PRESSURE: 100 MMHG | SYSTOLIC BLOOD PRESSURE: 171 MMHG

## 2018-01-01 VITALS — DIASTOLIC BLOOD PRESSURE: 89 MMHG | SYSTOLIC BLOOD PRESSURE: 181 MMHG

## 2018-01-01 VITALS — SYSTOLIC BLOOD PRESSURE: 195 MMHG | DIASTOLIC BLOOD PRESSURE: 78 MMHG

## 2018-01-01 VITALS — SYSTOLIC BLOOD PRESSURE: 225 MMHG | DIASTOLIC BLOOD PRESSURE: 90 MMHG

## 2018-01-01 RX ADMIN — NIACIN SCH MG: 500 TABLET, FILM COATED, EXTENDED RELEASE ORAL at 08:25

## 2018-01-01 RX ADMIN — MIDODRINE HYDROCHLORIDE SCH MG: 2.5 TABLET ORAL at 05:25

## 2018-01-01 RX ADMIN — Medication SCH CAP: at 08:24

## 2018-01-01 RX ADMIN — ZOLPIDEM TARTRATE SCH MG: 5 TABLET ORAL at 20:38

## 2018-01-01 RX ADMIN — CITALOPRAM HYDROBROMIDE SCH MG: 20 TABLET ORAL at 08:26

## 2018-01-01 RX ADMIN — Medication SCH MG: at 08:25

## 2018-01-01 RX ADMIN — MIDODRINE HYDROCHLORIDE SCH MG: 2.5 TABLET ORAL at 12:12

## 2018-01-01 RX ADMIN — LIDOCAINE SCH PATCH: 50 PATCH CUTANEOUS at 08:23

## 2018-01-01 RX ADMIN — BENZONATATE SCH MG: 100 CAPSULE, LIQUID FILLED ORAL at 20:40

## 2018-01-01 RX ADMIN — DICLOFENAC SODIUM SCH MG: 75 TABLET, DELAYED RELEASE ORAL at 08:24

## 2018-01-01 RX ADMIN — CHOLESTYRAMINE SCH GM: 4 POWDER, FOR SUSPENSION ORAL at 08:25

## 2018-01-01 RX ADMIN — CEFTRIAXONE SODIUM SCH GM: 1 INJECTION, POWDER, FOR SOLUTION INTRAMUSCULAR; INTRAVENOUS at 11:17

## 2018-01-01 RX ADMIN — Medication SCH CAP: at 20:39

## 2018-01-01 RX ADMIN — VITAMIN D, TAB 1000IU (100/BT) SCH UNIT: 25 TAB at 08:25

## 2018-01-01 RX ADMIN — DICLOFENAC SODIUM SCH MG: 75 TABLET, DELAYED RELEASE ORAL at 20:39

## 2018-01-01 RX ADMIN — BENZONATATE SCH MG: 100 CAPSULE, LIQUID FILLED ORAL at 15:20

## 2018-01-01 RX ADMIN — METOPROLOL TARTRATE SCH MG: 50 TABLET, FILM COATED ORAL at 08:24

## 2018-01-01 RX ADMIN — LIDOCAINE SCH PATCH: 50 PATCH CUTANEOUS at 12:31

## 2018-01-01 RX ADMIN — SODIUM CHLORIDE SCH MLS/HR: 0.9 INJECTION, SOLUTION INTRAVENOUS at 00:46

## 2018-01-01 RX ADMIN — POTASSIUM CHLORIDE SCH MEQ: 1500 TABLET, EXTENDED RELEASE ORAL at 08:24

## 2018-01-01 RX ADMIN — METOPROLOL TARTRATE SCH MG: 50 TABLET, FILM COATED ORAL at 20:38

## 2018-01-01 RX ADMIN — POTASSIUM CHLORIDE SCH MEQ: 1500 TABLET, EXTENDED RELEASE ORAL at 20:39

## 2018-01-01 NOTE — PN
DATE:  



SUBJECTIVE:  The patient is a 67-year-old female in with a possible seizure

activity as well as a fracture to her right lower leg.  She is resting fairly

comfortably, making fairly good progress overall, but still a lot of pain and

still trying to control.



PHYSICAL EXAMINATION:

VITAL SIGNS:  On evaluating her blood pressure went down as low as 70/53 (NC),

pulse up to 110, afebrile.  Otherwise, the patient is resting fairly

comfortably, making fairly good progress overall.

GENERAL:  The patient is alert and oriented x 3.  Speech spontaneous

appropriate.  Cranial nerves 2-12 grossly intact.

LUNGS:  Diminished, but clear.

CARDIOVASCULAR:  Regular sinus rhythm, S1, S2, without murmur, rub, thrill, or

extra heart sounds.

ABDOMEN:  Soft, nontender.  No rebounding.

EXTREMITIES:  Right lower leg is very painful for her.  She did not want the CAM

walker.  I told her if she did not want to hurt too much, we put her on ankle

splint.  The fracture is low fibular area.  In any case, the patient made good

progress during the rest of her hospitalization and there were no complications

noted.



IMPRESSION:  Fracture to the right lower fibula, hypertension, morbid obesity,

factor V Leiden deficiency.



PLAN:  The patient will continue with physical and occupational therapy.  We

will monitor her blood pressure and make further evaluation on her as indicated.

 She also has mild-to-moderate protein malnutrition.





______________________________

KELLY TRUJILLO MD



DR:  HAILEY/yuridia  JOB#:  [id number] / [id number]

DD:  12/30/2017 12:57  DT:  01/01/2018 15:44

## 2018-01-01 NOTE — CONS
DATE OF CONSULTATION:  12/30/2017



NEUROLOGIC CONSULTATION



REFERRING PHYSICIAN:  Dr. Piyush Hernandez.



REASON FOR CONSULTATION:  Syncope versus seizure.



HISTORY OF PRESENT ILLNESS:  This is a 67-year-old  female who was

admitted to Emergency Room on 12/28/2017 after she presented with a two syncopal

episodes.  According to the patient, the night before admission, the patient

tried to reach overhead in the closet to get a shirt, subsequently she felt "as

going to pass out."  She apparently fell straight down to the floor and she

lost consciousness of unknown period before she called for help.  The patient

likely stayed on the floor of the closet more than seconds.  She did hit her

head and right ankle.  She denies headaches, but the pain in the right ankle

continued.  The patient probably had some bladder incontinence.  She tried to

get up and go to bed, but the next day she tried to stand up and fell and felt

as if she is going to lose consciousness, but she did not.  The patient was

brought to Emergency Room and x-ray of the right ankle and distal lower

extremity revealed evidence of nondisplaced distal fibular fracture.  She denies

chest pain, shortness of breath or palpitation, dysarthria, dysphagia, numbness

or paraesthesia.  Initial nonenhanced head CT scan revealed no evidence of acute

intracranial process.  According to the patient 4 years ago, she had frequent

syncopal episodes.  She had an extensive cardiac workup for that at [facility] [place], which revealed no obvious cardiac etiology; however, she was seen by a

neurologist at [facility] [place] and she was told she possibly had "kind of

seizure."  Since that time, she has been taking Keppra, anticonvulsant, for

questionable undetected seizures.  The patient has not had any syncopal episodes

until recently.



PAST MEDICAL HISTORY:  Significant for lymphedema/lipedema of all upper and

lower extremities, hypertension, orthostatic hypotension, asthma, obstructive

sleep apnea but she does not use a CPAP, diverticulitis, fibromyalgia,

osteoarthritis, degenerative disk disease, history of elbow fracture, history of

von Willebrand disease, high protein C, cellulitis, and possible factor V Leiden

deficiency.



SOCIAL HISTORY:  The patient is .  She denies smoking, alcohol drinking,

or illicit drug use.



FAMILY HISTORY:  Noncontributory.



CURRENT MEDICATIONS:  Torsemide 40 mg weekly, Rocephin 1 gram q.24h., Celexa 20

mg daily, vitamin D3 1000 units daily, cholestyramine resin 4 gram daily, fish

oil 2000 mg daily, niacin 500 mg daily, potassium chloride 20 mEq b.i.d., Keppra

500 mg b.i.d., Voltaren 75 mg b.i.d., Ambien 5 mg at bedtime, Lopressor 50 mg

b.i.d., lidocaine patches daily for pain.



ALLERGIES:  IODINATED CONTRAST, IV DYE, BACITRACIN, IODINE, LEVOFLOXACIN,

SHELLFISH DERIVED, and VANCOMYCIN.



REVIEW OF SYSTEMS:  A 10-point review of system was performed and consistent

with frequent syncopal/near syncopal attacks and as mentioned above in history

of present illness.



PHYSICAL EXAMINATION:

GENERAL:  Moderately obese  female, not in acute distress.

VITAL SIGNS:  Blood pressure 129/79, respiratory rate 18, pulse is 80 and

regular, oxygen saturation is 92% on room air and temperature 98 Fahrenheit.

HEENT:  Normocephalic, atraumatic, otherwise unremarkable.

NECK:  Supple.  Negative for carotid bruit, lymphadenopathy or thyromegaly.

LUNGS:  Clear to A and P.

CARDIOVASCULAR:  Regular rate and rhythm, normal S1, S2.  There is no S3, S4 or

murmur.

ABDOMEN:  Soft.  Bowel sounds positive.

EXTREMITIES:  Positive for lipedema with brownish discoloration, mainly in the

distal lower extremities; however, the patient had a stabilizer on the right

lower extremity below the knee due to recent distal fibular fracture.

NEUROLOGICAL: 

1.  Mental Status:  The patient is alert and oriented x 3.  Speech is fluent. 

There is no language dysfunction.  Memory, judgment, and abstract thinking are

normal.  The patient denies hallucination or delusion.  Cranial nerves:  Visual

fields are full.  The pupils are reactive to light and accommodation.  The

extraocular movements are intact.  There is no nystagmus.  There is no facial

motor or sensory deficit.  Hearing is intact bilaterally.  The palate is

elevated symmetrically.  Sternocleidomastoid muscles are powerful bilaterally. 

The patient shrugs her shoulders symmetrically and protrudes her tongue in the

midline without fasciculation or atrophy.

2.  MOTOR:  No focal muscle bulk was seen.  The tone is normal.  The strength is

4/5 in the lower extremities and 5/5 in the upper extremities.  Sensory

examination revealed normal pinprick and light touch senses throughout.

3.  Deep tendon reflexes are symmetric and hypoactive with absent Achilles

responses.

4.  Gait not assessed at not tested at this time; however, the nursing stated

that the patient is being walking using a walker without difficulties.



DIAGNOSTIC:

1.  Initial nonenhanced CT scan revealed no evidence of acute intracranial

process.

2.  Carotid Doppler study revealed no evidence of acute internal carotid artery

stenosis.  Chest x-ray revealed no evidence of cardiopulmonary process and x-ray

to the right ankle revealed evidence of a nondisplaced distal fibular fracture.



LABORATORY DATA:  CBC revealed white blood cells of 7.3 thousand, hemoglobin

12.8, hematocrit 38.9, platelet count 245,000.  Chemistry revealed sodium of

137, potassium 4.6, chloride 103, CO2 25, BUN is 24, creatinine 1.1, glucose 95,

calcium is 8.4 and magnesium 2.  Lipid profile reveals triglycerides slightly

elevated at 164 with normal cholesterol and LDL at 87 and HDL at 40.  TSH was

normal at 3.18.



IMPRESSION:

1.  Recurrent syncope/re-syncope attacks.

3.  Questionable seizure disorder.

4.  Multiple medical problems includes history of endocarditis, obstructive

sleep apnea, hypertension, hyperlipidemia, orthostatic hypotension,

osteoarthritis, lipedema/lymphedema of the upper extremities and recent

nondisplaced right distal fibular fracture.



RECOMMENDATIONS:

1.  To obtain the medical record from Cardiology service at [facility] [place].

2.  Electroencephalogram, EEG.

3.  Continue with current management initiated by Dr. Hernandez.

4.  Physical therapy evaluation.

5.  Continue with current medications.





______________________________

M MIKE SANCHEZ MD



DR:  AYUSH/yuridia  JOB#:  [id number] / [id number]

DD:  12/30/2017 15:36  DT:  01/01/2018 13:42

## 2018-01-02 VITALS
DIASTOLIC BLOOD PRESSURE: 77 MMHG | SYSTOLIC BLOOD PRESSURE: 122 MMHG | SYSTOLIC BLOOD PRESSURE: 122 MMHG | DIASTOLIC BLOOD PRESSURE: 77 MMHG

## 2018-01-02 VITALS — SYSTOLIC BLOOD PRESSURE: 116 MMHG | DIASTOLIC BLOOD PRESSURE: 71 MMHG

## 2018-01-02 LAB
ANION GAP SERPL CALC-SCNC: 10 MMOL/L (ref 6–14)
CA-I SERPL ISE-MCNC: 14 MG/DL (ref 7–20)
CALCIUM SERPL-MCNC: 8.7 MG/DL (ref 8.5–10.1)
CHLORIDE SERPL-SCNC: 106 MMOL/L (ref 98–107)
CO2 SERPL-SCNC: 26 MMOL/L (ref 21–32)
CREAT SERPL-MCNC: 0.8 MG/DL (ref 0.6–1)
GFR SERPLBLD BASED ON 1.73 SQ M-ARVRAT: 72 ML/MIN
GLUCOSE SERPL-MCNC: 94 MG/DL (ref 70–99)
POTASSIUM SERPL-SCNC: 4.5 MMOL/L (ref 3.5–5.1)
SODIUM SERPL-SCNC: 142 MMOL/L (ref 136–145)

## 2018-01-02 RX ADMIN — Medication SCH MG: at 08:34

## 2018-01-02 RX ADMIN — BENZONATATE SCH MG: 100 CAPSULE, LIQUID FILLED ORAL at 08:35

## 2018-01-02 RX ADMIN — LIDOCAINE SCH PATCH: 50 PATCH CUTANEOUS at 08:38

## 2018-01-02 RX ADMIN — Medication SCH CAP: at 08:34

## 2018-01-02 RX ADMIN — CITALOPRAM HYDROBROMIDE SCH MG: 20 TABLET ORAL at 08:42

## 2018-01-02 RX ADMIN — DICLOFENAC SODIUM SCH MG: 75 TABLET, DELAYED RELEASE ORAL at 08:39

## 2018-01-02 RX ADMIN — VITAMIN D, TAB 1000IU (100/BT) SCH UNIT: 25 TAB at 08:34

## 2018-01-02 RX ADMIN — CHOLESTYRAMINE SCH GM: 4 POWDER, FOR SUSPENSION ORAL at 08:33

## 2018-01-02 RX ADMIN — METOPROLOL TARTRATE SCH MG: 50 TABLET, FILM COATED ORAL at 08:35

## 2018-01-02 RX ADMIN — BENZONATATE SCH MG: 100 CAPSULE, LIQUID FILLED ORAL at 08:57

## 2018-01-02 RX ADMIN — NIACIN SCH MG: 500 TABLET, FILM COATED, EXTENDED RELEASE ORAL at 08:34

## 2018-01-02 RX ADMIN — POTASSIUM CHLORIDE SCH MEQ: 1500 TABLET, EXTENDED RELEASE ORAL at 08:34

## 2018-01-02 NOTE — CARD
MR#: [id number]

Account#: [id number]

Accession#: [id number]

Date of Study: 12/31/2017

Ordering Physician: JUVENTINO KUO, 

Referring Physician: KELLY TRUJILLO 

Tech: Celia Jurado RDCS





--------------- APPROVED REPORT --------------





EXAM: Two-dimensional and M-mode echocardiogram with Doppler and color Doppler.



Other Information 

HR: 78bpm

Rhythm : NSR



INDICATION

Syncope 



RISK FACTORS

Obesity   



2D DIMENSIONS 

Left Atrium(2D)3.6 (1.6-4.0cm)IVSd0.9 (0.7-1.1cm)

Aortic Root(2D)3.0 (2.0-3.7cm)LVDd3.8 (3.9-5.9cm)

LVOT Diameter2.0 (1.8-2.4cm)PWd0.9 (0.7-1.1cm)

LVDs2.4 (2.5-4.0cm)FS (%) 38.1 %

SV43.0 mlLVEF(%)69.0 (>50%)



Aortic Valve

AoV Peak Marshall.151.7cm/sAoV VTI27.0cm

AO Peak GR.9.2mmHgLVOT Peak Marshall.109.9cm/s

LVOT  VTI 18.44cmAO Mean GR.5mmHg

JENNIFER (VMAX)2.28ng1SPT   (VTI)2.21cm2



Mitral Valve

MV E Eywngqra37.2cm/sMV DECEL [id number]

MV A Gxfhatlt44.8cm/sE/A  Ratio0.7



Tricuspid Valve

TR P. Amketexb506xa/sRAP HPMOPPBZ2kyEq

TR Peak Gr.17mmHg



 LEFT VENTRICLE 

The left ventricle is normal size. There is normal left ventricular wall thickness. Left ventricle sy
stolic function is normal. The Ejection Fraction is 55-60%. There is normal LV segmental wall motion.
 Transmitral Doppler flow pattern is Grade I-abnormal relaxation pattern.



 RIGHT VENTRICLE 

The right ventricle is normal size. There is normal right ventricular wall thickness. The right ventr
icular systolic function is normal.



 ATRIA 

The left atrium size is normal. The right atrium size is normal. The interatrial septum is intact wit
h no evidence for an atrial septal defect or patent foramen ovale as noted on 2-D or Doppler imaging.




 AORTIC VALVE 

The aortic valve is normal in structure and function. Doppler and Color Flow revealed trace aortic re
gurgitation. There is no significant aortic valvular stenosis.



 MITRAL VALVE 

The mitral valve is normal in structure and function. There is no mitral valve stenosis. Doppler and 
Color Flow revealed no mitral valve regurgitation noted.



 TRICUSPID VALVE 

The tricuspid valve is normal in structure and function. Doppler and Color Flow revealed trace tricus
pid regurgitation. The PA pressure was estimated at 20 mmHg. There is no tricuspid valve stenosis.



 PULMONIC VALVE 

PV not well visualized. Doppler and Color Flow revealed no pulmonic valvular regurgitation. There is 
no pulmonic valvular stenosis.



 GREAT VESSELS 

The aortic root is normal in size. Normal pulmonary venous flow (Doppler). The IVC is normal in size 
and collapses >50% with inspiration.



 PERICARDIAL EFFUSION 

There is no evidence of significant pericardial effusion.



Critical Notification

Critical Value: No



<Conclusion>

Technically difficult study.

Left ventricle systolic function is normal.

The Ejection Fraction is 55-60%.

There is normal LV segmental wall motion.

Transmitral Doppler flow pattern is Grade I-abnormal relaxation pattern.

Doppler and Color Flow revealed trace tricuspid regurgitation.

The PA pressure was estimated at 20 mmHg.

There is no evidence of significant pericardial effusion.



Signed by : Lobo Middleton, 

Electronically Approved : 01/02/2018 09:02:38

## 2018-01-02 NOTE — PN
DATE:  01/01/2018



SUBJECTIVE:  The patient with syncope, fracture of the right fibular distal. 

The patient is resting fairly comfortably and making fairly good progress. 

Blood pressure was up (NC).  We will give her some Lasix and then go ahead and

apparently she refused to take the midodrine anyway, so we will continue to

monitor her blood pressure and try to get it down.  She was only on one dose of

metoprolol because her blood pressure was 90.  Otherwise, the patient does have

a wet cough.  She is on Rocephin.



OBJECTIVE:

VITAL SIGNS:  Blood pressure presently 180/90, respiratory rate 20, pulse 80,

afebrile.

LUNGS:  Diminished throughout, but clear.

CARDIOVASCULAR:  Regular sinus rhythm.

EXTREMITIES:  Right leg in splint.



PLAN:  We will go over the rehab tomorrow.  Hopefully, get her blood pressure

down, make further evaluation and discontinue fluids.





______________________________

KELLY TRUJILLO MD



DR:  HAILEY/yuridia  JOB#:  [id number] / [id number]

DD:  01/01/2018 15:00  DT:  01/02/2018 02:24

## 2018-01-02 NOTE — PN
DATE:  12/31/2017



SUBJECTIVE:  The patient stated she had a very brief spell yesterday when she

was taking a shower.  The patient was in the wheelchair when she was turned

facing the shower she felt as if she is going to pass out.  The symptom lasted

about a few seconds and recovered by drinking ice water.  She denies any

complete syncopal attacks.  Currently, she stated she has always generalized

mild headaches.  She denies nausea, vomiting, chest pain, shortness of breath or

palpitation.



OBJECTIVE:

GENERAL:  Well-developed, well-nourished white female, not in acute distress.

VITAL SIGNS:  Blood pressure 142/84, respiratory rate 20, pulse is 78,

temperature 98.2, oxygen saturation 94% on room air.

HEENT:  Normocephalic, atraumatic, otherwise unremarkable.

NECK:  Supple.  Negative for carotid bruit, lymphadenopathy or thyromegaly.

LUNGS:  Clear to A and P.

CARDIOVASCULAR:  Regular rate and rhythm, normal S1, S2.

ABDOMEN:  Soft.  Bowel sounds positive.

EXTREMITIES:  Lipedema throughout upper and lower extremities.

NEUROLOGIC:  The patient is alert and oriented x 3.  Speech is fluent.  There is

no language dysfunction.  Memory, judgment, and abstract thinking are normal. 

The patient denies hallucination or delusion.  Cranial nerves are intact.  No

focal motor or sensory deficit.  The strength is 4/5 throughout. The strength in

the right lower extremity is difficult to evaluate because of recent fracture of

right distal fibular on.  Deep tendon reflexes are symmetric and active without

pathologic responses.  Gait not tested.



LABORATORY DATA:  CBC revealed white blood cells of 4.6 thousand, hemoglobin

12.1, hematocrit 36.1 and platelet count 227,000.  Chemistry revealed sodium of

139, potassium 4.6, chloride 106, CO2 of 23, BUN 21, creatinine 0.8, glucose 97.



IMPRESSION:  

1. Recurrent presyncope/syncope.  The patient stated sometimes may emotion

contribute to her symptoms.

2. Questionable underlying seizure.

3. Multiple medical problem includes asthma, obstructive sleep apnea,

osteoarthritis, hypertension, hyperlipidemia, and rule out orthostatic

hypotension.

4. Recent nondisplaced fracture of the right distal fibula.



RECOMMENDATIONS:

1. Continue with current home medications.

2. Continue with current management initiated by Dr. Hernandez.

3. Physical therapy as tolerated.





______________________________

M MIKE SANCHEZ MD



DR:  Rosemary  JOB#:  [id number] / [id number]

DD:  12/31/2017 12:21  DT:  01/01/2018 15:47

## 2018-01-02 NOTE — PN
DATE:  01/01/2018



SUBJECTIVE:  The patient denies any recurrent syncopal spells or seizure-like

activities.  However, she has been refusing taking midodrine because she stated

she is not supposed to take midodrine when she is in positions and because she

is always in bed in supine position, therefore, she has not been taking the

medicine for the last 3 days.  She denies any new medical or neurological

complaints.



OBJECTIVE:

GENERAL:  Moderately obese white female, not in acute distress.

VITAL SIGNS:  Blood pressure 195/78, respiratory rate 20, pulse is 63,

temperature is 98.4, oxygen saturation is 97% on room air.

HEENT:  Normocephalic, atraumatic, otherwise unremarkable.

NECK:  Supple.  Negative for carotid bruit, lymphadenopathy, or thyromegaly.

LUNGS:  Clear to A and P.

CARDIOVASCULAR:  Regular rate and rhythm, normal S1-S2.  There is no S3, S4 or

murmur.

ABDOMEN:  Soft.  Bowel sounds positive.

EXTREMITIES:  Negative for cyanosis, clubbing or pitting edema.

NEUROLOGICAL EXAM:  Mental Status:  The patient is alert and oriented x 3. 

Speech is fluent.  There is no language dysfunction.  Memory, judgment, and

abstract thinking are normal.  The patient denies hallucination or delusion. 

Cranial nerves are intact.  Motor Examination:  No focal muscle bulk is seen. 

The tone is normal.  The strength is 4/5 throughout.  Sensory examination

revealed normal pinprick and light touch senses throughout.  Deep tendon

reflexes were symmetric and hypoactive with absent Achilles responses.  Gait not

tested.



IMPRESSION:

1.  Syncope/presyncope attacks, probably due to orthostatic hypotension.  The

patient refused to take midodrine.

2.  A questionable history of seizure; however, the patient has been taking

Keppra for the last 4 years.

3.  Multiple medical problems include orthostatic hypotension, depressions,

arthritis, hypertension, obstructive sleep apnea, osteoarthritis, degenerative

disk disease and the fracture of the right distal fibula bone and

hyperlipidemia.



RECOMMENDATIONS:  Continue with current management initiated by Dr. Hernandez. 

The patient should resume taking midodrine #3, physical therapy as tolerated.





______________________________

M MIKE SANCHEZ MD



DR:  AYUSH/yuridia  JOB#:  [id number] / [id number]

DD:  01/01/2018 12:28  DT:  01/02/2018 00:08

## 2020-02-25 ENCOUNTER — HOSPITAL ENCOUNTER (OUTPATIENT)
Dept: HOSPITAL 63 - DXRAD | Age: 70
Discharge: HOME | End: 2020-02-25
Attending: FAMILY MEDICINE
Payer: MEDICARE

## 2020-02-25 DIAGNOSIS — S52.572A: Primary | ICD-10-CM

## 2020-02-25 DIAGNOSIS — S60.221A: ICD-10-CM
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
## 2020-02-25 DIAGNOSIS — S60.211A: ICD-10-CM
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
## 2020-02-25 DIAGNOSIS — S60.222A: ICD-10-CM
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
## 2020-02-25 DIAGNOSIS — S60.212A: ICD-10-CM

## 2020-02-25 DIAGNOSIS — S60.011A: ICD-10-CM
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
## 2020-02-25 PROCEDURE — 73110 X-RAY EXAM OF WRIST: CPT

## 2020-02-25 PROCEDURE — 73130 X-RAY EXAM OF HAND: CPT

## 2020-02-25 PROCEDURE — 73090 X-RAY EXAM OF FOREARM: CPT

## 2020-02-25 NOTE — RAD
PROCEDURE: FOREARM BILAT, HAND BILAT 3V, WRIST BILAT 3V

 

STUDY DATE: 2/25/2020

 

CLINICAL INDICATION / HISTORY: Contusion to both hands, wrists and 

forearms.

 

TECHNIQUE: Bilateral forearms, 2 views each. AP and lateral views.

 

COMPARISON: None.

 

FINDINGS:  No fracture or dislocation is identified. The bone density is 

mildly demineralized. The wrist and elbow joints are approximated. No soft

tissue abnormality is seen.

 

IMPRESSION: No abnormality identified.

 

 

 

PROCEDURE: FOREARM BILAT, HAND BILAT 3V, WRIST BILAT 3V

 

STUDY DATE: 2/25/2020

 

CLINICAL INDICATION / HISTORY: Contusion to both hands, wrists and 

forearms.

 

TECHNIQUE: PA, lateral and oblique views of the bilateral hand.

 

COMPARISON: None

 

FINDINGS: No fracture or dislocation is identified. The bone density is 

mildly demineralized.. The joint spaces are maintained, and there are no 

erosions to suggest an inflammatory arthropathy. The soft tissues are 

unremarkable.

 

IMPRESSION: No acute osseous abnormality in the bilateral hands.

 

 

 

 

PROCEDURE: FOREARM BILAT, HAND BILAT 3V, WRIST BILAT 3V

 

STUDY DATE: 2/25/2020

 

CLINICAL INDICATION / HISTORY: Contusion to both hands, wrists and 

forearms.

 

TECHNIQUE: Bilateral wrists 3  views each .  AP, lateral, and oblique 

views.

 

COMPARISON: None

 

FINDINGS: There is mild cortical buckling at the left radial distal 

metaphysis. The radiocarpal and intracarpal relationships are maintained. 

There is otherwise no fracture or dislocation. The bone density is mildly 

demineralized. No soft tissue abnormality is seen.

 

IMPRESSION: 

 

Acute intra-articular mild impaction fracture at the left radial 

metaphysis. Otherwise no fracture in the wrists.

 

Electronically signed by: Ginna Prater MD (2/25/2020 5:27 PM) 

[id number]

## 2020-08-21 ENCOUNTER — HOSPITAL ENCOUNTER (OUTPATIENT)
Dept: HOSPITAL 61 - PNCL | Age: 70
Discharge: HOME | End: 2020-08-21
Attending: ANESTHESIOLOGY
Payer: MEDICARE

## 2020-08-21 DIAGNOSIS — M54.5: Primary | ICD-10-CM

## 2020-08-21 DIAGNOSIS — M79.661: ICD-10-CM

## 2020-08-21 DIAGNOSIS — Z88.8: ICD-10-CM

## 2020-08-21 DIAGNOSIS — Z79.899: ICD-10-CM

## 2020-08-21 DIAGNOSIS — J45.909: ICD-10-CM

## 2020-08-21 DIAGNOSIS — M19.90: ICD-10-CM

## 2020-08-21 DIAGNOSIS — I10: ICD-10-CM

## 2020-08-21 PROCEDURE — 62323 NJX INTERLAMINAR LMBR/SAC: CPT

## 2020-08-21 NOTE — PDOC2
INITIAL PAIN CONSULT


DATE OF SERVICE:


DOS:


DATE: 8/21/20 


TIME: 13:43





CHIEF COMPLAINT:


Chief Complaint:


Low back and right lower extremity pain





HISTORY OF PRESENT ILLNESS:


70-year-old female presents with history of pain low back right lower extremity 

for about 6 months after she fell at home February 24, 2020 patient report 

before that she was doing fairly well but did have some pain in the low back but

now is in the low back rating to the right lower extremity in the posterior 

lateral thigh lateral anterior thigh anterior medial thigh medial lower leg and 

into the posterior calf on the right as well.  Patient which is worse with 

walking standing changing positions better with sitting or laying down does not 

awaken her from sleep generally but she does take sleeping aids to sleep patient

reports it does affect her bowel bladder bowel and bladder control with some 

increased urgency but no loss of bowel function patient scribes pain is sharp 

and shooting in the low back and right lower extremity intermittent intensity 

worse with walking standing radiating in the right lower extremity as described.

 Patient has tried physical therapy as well as chiropractic treatment which did 

help but only to a temporary period of time and is not long-lasting.  Patient is

been taking over-the-counter anti-inflammatories as well as Tylenol with mild 

decrease in pain as well patient did have a CT scan of the lumbar spine showing 

L4-5 severe degenerative loss of disc height with disc bulge in the cephalad 

direction causing moderate left and moderate to severe right foraminal 

narrowing.  Patient reports no overt loss of motor function but significant 

fatigability with the right lower extremity with ambulation..





PAST MEDICAL HISTORY:


PMH:


CV hypertension, irregular heart rhythm, endocarditis, asthma, dizziness, 

arthritis, kidney stones.





PREVIOUS SURGERIES:


Past Surgical Hx:


Bilateral cataract extraction, meniscus surgery bilaterally 2006 and D&C 1988





CURRENT MEDICATIONS:


Current Meds:





Active Scripts








 Medications  Dose


 Route/Sig


 Max Daily Dose Days Date Category


 


 Niacin Flush-Free


 500 Mg Cap


  (Niacin (Inositol


 Niacinate)) 400


 Mg Capsule  500 Mg


 PO DAILY


   8/21/20 Reported


 


 [eye agustina


 w/lutein]  


 


   8/21/20 Reported


 


 Co Q-10


  (Ubidecarenone)


 200 Mg Capsule  1 Cap


 PO DAILY


  30 8/21/20 Reported


 


 Vitamin D3-Aloe


 1,000 Unit Tab


  (Ca Cmb 1/Vit


 D3/B-6/Fa/B12/Av)


 1 Each Tablet  1 Each


 PO DAILY


   8/21/20 Reported


 


 Fish Oil 1,200 mg


 Softgel


  (Omega-3/Dha/Epa/Fish


 Oil) 1 Each


 Capsule.dr  1 Cap


 PO BID


  30 8/21/20 Reported


 


 Prevalite Packet


  (Cholestyramine/Aspartame)


 4 Gm Powd.pack  4 Gm


 PO 1-2XD


   8/21/20 Reported


 


 Potassium


 Chloride **


  (Potassium


 Chloride) 20 Meq


 Tablet.er  20 Meq


 PO BID


   8/21/20 Reported


 


 Zolpidem Tartrate


 5 Mg Tablet  5 Mg


 PO PRN QHS PRN


   8/21/20 Reported


 


 Keppra


  (Levetiracetam)


 500 Mg Tablet  1 Tab


 PO BID


  30 8/21/20 Reported


 


 Diclofenac Sodium


 75 Mg Tablet.dr  1 Tab


 PO BID


   8/21/20 Reported


 


 Toprol XL


  (Metoprolol


 Succinate) 50 Mg


 Tab.er.24h  50 Mg


 PO DAILY


   8/21/20 Reported











ALLERGIES;


Allergies:  


Coded Allergies:  


     bacitracin (Verified  Allergy, Severe, hematuria,, 8/21/20)


     levofloxacin (Verified  Allergy, Severe, swelling, 8/21/20)


     vancomycin (Verified  Allergy, Severe, swelling, 8/21/20)


     iodine (Verified  Allergy, Intermediate, 8/21/20)


     shellfish derived (Verified  Allergy, Intermediate, 8/21/20)


Uncoded Allergies:  


     IV contrast (Allergy, Intermediate, 8/21/20)





FAMILY HISTORY:


Family Hx:


Alzheimer's disease





SOCIAL HISTORY:


Social Hx:


Patient does not drink alcohol does not smoke does not use any illegal illicit 

or recreational drugs is retired lives locally in [facility]





REVIEW OF SYSTEMS:


ROS:


As of those on his mid to his present illness all systems are reviewed otherwise

negative complete full well-documented on patient's chart





PHYSICAL EXAM:


VS:


Pressure 152/74 pulse 58 respirations 20 temperature is 98.4 F height is 5 foot

4 inches weight is 207 pounds


PE:


PHYSICAL EXAMINATION:





GENERAL: The patient is awake, alert, oriented, appropriate, very pleasant 

demeanor


HEENT: Shows normocephalic, atraumatic.  Extraocular movements are intact and 

symmetrical.  Oral cavity: Mucous membranes moist and pink.  Dentition is 

intact.


NECK: Shows anterior throat supple without palpable lymphadenopathy noted.  

Swallow reflex symmetrical.


CHEST: Shows normal on inspection.  Breath sounds are clear bilaterally, no 

rales rhonchi or wheezes auscultated.


HEART: Shows S1, S2 clear.  No murmurs auscultated.


ABDOMEN: Soft, nontender, nondistended.  No palpable organomegaly is noted.  No 

rebound or guarding demonstrated.


BACK: Shows spine grossly in the midline.  Normal-appearing cervical lordotic 

curvature.  There is slightly increased thoracic kyphosis, some minor flattening

of the lumbar lordotic curvature.  Lumbar paraspinous muscles show symmetrical 

on inspection, on palpation shows some moderate tenderness diffusely throughout 

the upper, middle and lower distribution of the paraspinous muscles bilaterally 

and also into the lower thoracic paraspinous musculature, firm and tender, but 

without specific trigger points, without radiation of pain.  The patient has 

good rotational motion of the lumbar spine, both laterally as well as extension 

and flexion without significant difficulty.  No tenderness over the spinous 

processes, sacrum or sacroiliac regions.


EXTREMITIES: Lower extremities show deep tendon reflexes 1+ in the patellar and 

tendo calcaneus tendons.  Motor exam is 4 on a scale of 5 with right dorsifle

xion, extension, quadriceps and hamstring flexion and 5/5 on the left.  

Peripheral pulses are 1+ posterior tibial.  No peripheral edema is noted 

bilaterally.  Lower extremities are warm and dry to touch, equal in color and 

appearance.  Straight leg raise noted to be positive on the right about 35 

degrees, left side is negative.  Gaenslen's and Valeriy's maneuvers are negative

as well.  The patient is able to stand, needs help rising from a seated position

using the arms of the chair.  Walks with a walker which she has with her today.


SKIN: Shows warm and dry, good turgor.  No edema.  No sores, rashes or bruising 

throughout.





IMPRESSION:


Impression:


Is a 70-year-old female with approximate 6-month history low back right lower e

xtremity pain and radicular fashion.


MRI scan lumbar spine as noted


Obesity


Hypertension


Arthritis





Plan: Options were discussed with the patient including conservative medical 

management physical therapies interventional techniques.  Patient would like to 

pursue interventional techniques.  We discussed a lumbar epidural steroid 

duration using descriptions as well as anatomical model to describe the 

procedure.  Risks are then discussed including but not limited to bleeding 

infection possibility of epidural hematoma and subsequent neurological 

compromise dural puncture headache spinal cord and or nerve damage side effects 

of steroid medication and poor results rating pain control.  Patient understands

and agrees would like to pursue this and will follow-up in approximately 2 

weeks; patient was counseled as to return appointment activity level and side 

effects to be aware of.





Procedure is lumbar epidural steroid injection under local anesthetic using 

sterile prep and drape at the L4-5 level using C-arm fluoroscopic guidance in 

both AP and lateral views medications injected is 120 mg Depo-Medrol + 10 mL 

preservative-free normal saline and 2 mL contrast- condition at discharge is 

stable patient tolerated procedure well had no complications.











TOM CARR MD               Aug 21, 2020 13:50

## 2020-09-09 ENCOUNTER — HOSPITAL ENCOUNTER (OUTPATIENT)
Dept: HOSPITAL 61 - PNCL | Age: 70
Discharge: HOME | End: 2020-09-09
Attending: ANESTHESIOLOGY
Payer: MEDICARE

## 2020-09-09 DIAGNOSIS — I10: ICD-10-CM

## 2020-09-09 DIAGNOSIS — Z79.899: ICD-10-CM

## 2020-09-09 DIAGNOSIS — Z88.8: ICD-10-CM

## 2020-09-09 DIAGNOSIS — M51.16: Primary | ICD-10-CM

## 2020-09-09 DIAGNOSIS — M48.061: ICD-10-CM

## 2020-09-09 PROCEDURE — 62323 NJX INTERLAMINAR LMBR/SAC: CPT

## 2020-09-09 NOTE — PDOC
Progress Note - Pain Clinic


Date of Service:


DOS:


DATE: 9/9/20 


TIME: 09:46





Diagnosis:


Dx:


Lumbar radiculopathy with lumbar degenerative disease and lumbar spinal stenosis





History or Present Illness:


HPI:


70-year-old female returns follow-up status post lumbar epidural to injection 

x1.  Patient reports about 85% better with balance as well as decreased pain in 

her lower extremities especially in the back and the knees patient reports of 

increased activity greater ease and comfort walking greater distances doing 

household activities try with greater ease sleeping better at night occasionally

wakes her from sleep but not more than once a night patient reports no new motor

or sensory deficits no new bowel or bladder incontinence reports her pain is a 7

on a scale of 10 is worse over the past week 3 on average to its least.  

Describes pain as dull and tight in the back rating the posterior gluteus later

al thighs anterior thighs medial thighs to the knees bilaterally which can be 

constant with activity much better with sitting and laying down.  Patient 

reports no new motor or sensory deficits no new bowel or bladder incontinence or

other complaints.





Physical Exam:


VS:


Blood pressure is 194/96 pulse 84 respirations are 18 temperature 98.0 F height

is 5 feet 4 inches weight is 208 pounds


PE:


PHYSICAL EXAMINATION:





GENERAL: The patient is awake, alert, oriented, appropriate, very pleasant 

demeanor


HEENT: Shows normocephalic, atraumatic.  Extraocular movements are intact and 

symmetrical.  Oral cavity: Mucous membranes moist and pink.  


NECK: Shows anterior throat supple without palpable lymphadenopathy noted.  

Swallow reflex symmetrical.


CHEST: Shows normal on inspection.  Breath sounds are clear bilaterally.


HEART: Shows S1, S2 clear.  No murmurs auscultated.


ABDOMEN: Soft, nontender, nondistended.  No palpable organomegaly is noted.  No 

rebound or guarding demonstrated.


BACK: Shows spine grossly in the midline.  Normal-appearing cervical lordotic 

curvature.  There is slightly increased thoracic kyphosis, some minor flattening

of the lumbar lordotic curvature.  Lumbar paraspinous muscles show symmetrical o

n inspection, on palpation shows some moderate tenderness diffusely throughout 

the upper, middle and lower distribution of the paraspinous muscles bilaterally 

but without specific trigger points, without radiation of pain.  The patient has

good rotational motion of the lumbar spine, both laterally as well as extension 

and flexion without significant difficulty.  No tenderness over the spinous 

processes, sacrum or sacroiliac regions.


EXTREMITIES: Lower extremities show deep tendon reflexes 1+ in the patellar and 

tendo calcaneus tendons.  Motor exam is 4 on a scale of 5 with right 

dorsiflexion, extension, quadriceps and hamstring flexion and 5/5 on the left.  

Peripheral pulses are 1+ posterior tibial.  No peripheral edema is noted 

bilaterally.  Lower extremities are warm and dry to touch, equal in color and 

appearance





Procedure:


Procedure:


Options were discussed with the patient.  Patient's old chart was reviewed as 

her current medication regimen updated current review of systems updated today 

as well.  We will proceed with a second in the series lumbar epidural steroid 

injection today with fluoroscopic guidance.  Risks were discussed including but 

not limited to: Bleeding, infection, possibility of epidural hematoma and 

subsequent neurological compromise, dural puncture, headaches, spinal cord 

and/or nerve damage, side effects of steroid medication, and poor results 

regarding pain control.  Patient understands wished to proceed.  Patient return 

to clinic in approximate 2 weeks for follow-up was counseled as to return 

appointment activity level and side effects to be aware of.





Medication Injected:


Med Injected:


Procedure is lumbar epidural steroid injection under local anesthetic using 

sterile prep and drape at the L4-5 level using C-arm fluoroscopic guidance in 

both AP and lateral views medications injected is 120 mg Depo-Medrol + 10 mL 

preservative-free normal saline and 2 mL contrast- condition at discharge is 

stable patient tolerated procedure well had no complications.





Condition at Discharge:


Condition at Discharge:


Condition at discharge stable patient tolerated procedure well had no 

complications.











TOM CARR MD                Sep 9, 2020 09:49

## 2021-01-06 ENCOUNTER — HOSPITAL ENCOUNTER (OUTPATIENT)
Dept: HOSPITAL 61 - PNCL | Age: 71
Discharge: HOME | End: 2021-01-06
Attending: ANESTHESIOLOGY
Payer: MEDICARE

## 2021-01-06 DIAGNOSIS — Z91.041: ICD-10-CM

## 2021-01-06 DIAGNOSIS — M48.061: ICD-10-CM

## 2021-01-06 DIAGNOSIS — M51.16: Primary | ICD-10-CM

## 2021-01-06 DIAGNOSIS — Z91.013: ICD-10-CM

## 2021-01-06 DIAGNOSIS — Z88.8: ICD-10-CM

## 2021-01-06 DIAGNOSIS — Z88.1: ICD-10-CM

## 2021-01-06 PROCEDURE — 62323 NJX INTERLAMINAR LMBR/SAC: CPT

## 2021-01-06 NOTE — PDOC
Progress Note - Pain Clinic


Date of Service:


DOS:


DATE: 1/6/21 


TIME: 10:39





Diagnosis:


Dx:


Lumbar radiculopathy with lumbar degenerative disease and lumbar spinal stenosis





History or Present Illness:


HPI:


70-year-old female returns to follow-up status post lumbar epidural steroid 

injection x2.  Last seen September 9, 2020 patient reports initially she had by 

85% improvement but the pain was not significantly reduced after second 

injection patient ports pain low back right lower extremity posterior gluteus 

posterior lateral thigh lateral anterior thigh anterior medial thigh medial 

lower leg and calf on the right side worse with walking standing changing 

positions.  Patient describes as aching and sharp shooting in the right leg as 

well as some stabbing pain that can be severe and unbearable with walking 

standing better with sitting or laying down generally does not awaken her from 

sleep at night.  Patient reports no new motor or sensory deficits but some 

constipation but no incontinence.  Patient describes her pain is 8 on scale 10 

is worse over the past week 6 on average 5 its least and is a 6 today.





Physical Exam:


VS:


blood pressure is 133/77 pulse 75 respirations 16 temperature is 97.8 F height 

is 5 foot 4 inches weight is 196 pounds


PE:


PHYSICAL EXAMINATION:





GENERAL: The patient is awake, alert, oriented, appropriate, very pleasant 

demeanor


HEENT: Shows normocephalic, atraumatic.  Extraocular movements are intact and 

symmetrical.  Oral cavity: Mucous membranes moist and pink. 


NECK: Shows anterior throat supple without palpable lymphadenopathy noted.  

Swallow reflex symmetrical.


CHEST: Shows normal on inspection.  Breath sounds are clear bilaterally, no 

rales or rhonchi.


HEART: Shows S1, S2 clear.  No murmurs auscultated.


ABDOMEN: Soft, nontender, nondistended, obese.  No palpable organomegaly is 

noted.  No rebound or guarding demonstrated.


BACK: Shows spine grossly in the midline.  Normal-appearing cervical lordotic 

curvature.  There is slightly increased thoracic kyphosis, some minor flattening

of the lumbar lordotic curvature.  Lumbar paraspinous muscles show symmetrical 

on inspection, on palpation shows some moderate tenderness diffusely throughout 

the upper, middle and lower distribution of the paraspinous muscles, but without

specific trigger points, without radiation of pain.  The patient has good 

rotational motion of the lumbar spine, both laterally as well as extension and 

flexion without significant difficulty.  No tenderness over the spinous 

processes, sacrum or sacroiliac regions.


EXTREMITIES: Lower extremities show deep tendon reflexes 1+ in the patellar and 

tendo calcaneus tendons.  Motor exam is 4 on a scale of 5 with right 

dorsiflexion, extension, quadriceps and hamstring flexion and 5/5 on the left.  

Peripheral pulses are 1+ posterior tibial.  No peripheral edema is noted 

bilaterally.  Lower extremities are warm and dry to touch, equal in color and 

appearance.


SKIN: Shows warm and dry, good turgor.  No edema.  No sores, rashes or bruising 

throughout.





Procedure:


Procedure:


Options discussed with the patient.  Patient chart reviews her current medicat

ion regimen updated current review of systems updated today as well.  We will 

proceed with a lumbar epidural steroid injections the third in the series with 

fluoroscopic guidance today.  Risks were discussed including but not limited to:

Bleeding, infection, possibility of epidural hematoma and subsequent 

neurological compromise, dural puncture, headaches, spinal cord and/or nerve 

damage, side effects of steroid medication, and poor results regarding pain 

control.  Patient understands wished to proceed.  Patient will return to clinic 

in approximate 2 weeks for follow-up, was counseled as to return appointment 

activity level and side effects to be aware of.





Medication Injected:


Med Injected:


Procedure is lumbar epidural steroid injection under local anesthetic using 

sterile prep and drape at the L4-5 level using C-arm fluoroscopic guidance in 

both AP and lateral views medications injected is 120 mg Depo-Medrol + 10 mL 

preservative-free normal saline and 2 mL contrast- condition at discharge is 

stable patient tolerated procedure well had no complications.





Condition at Discharge:


Condition at Discharge:


Condition at discharge stable, patient tolerated procedure well and had no 

complications.











TOM CARR MD                Jan 6, 2021 10:42

## 2021-04-05 ENCOUNTER — HOSPITAL ENCOUNTER (INPATIENT)
Dept: HOSPITAL 63 - ER | Age: 71
LOS: 1 days | Discharge: ANOTHER HOSPITAL | DRG: 871 | End: 2021-04-06
Attending: FAMILY MEDICINE | Admitting: FAMILY MEDICINE
Payer: MEDICARE

## 2021-04-05 VITALS — SYSTOLIC BLOOD PRESSURE: 92 MMHG | DIASTOLIC BLOOD PRESSURE: 62 MMHG

## 2021-04-05 VITALS — DIASTOLIC BLOOD PRESSURE: 55 MMHG | SYSTOLIC BLOOD PRESSURE: 105 MMHG

## 2021-04-05 VITALS — SYSTOLIC BLOOD PRESSURE: 99 MMHG | DIASTOLIC BLOOD PRESSURE: 74 MMHG

## 2021-04-05 VITALS — SYSTOLIC BLOOD PRESSURE: 114 MMHG | DIASTOLIC BLOOD PRESSURE: 37 MMHG

## 2021-04-05 VITALS — SYSTOLIC BLOOD PRESSURE: 82 MMHG | DIASTOLIC BLOOD PRESSURE: 44 MMHG

## 2021-04-05 VITALS — SYSTOLIC BLOOD PRESSURE: 99 MMHG | DIASTOLIC BLOOD PRESSURE: 54 MMHG

## 2021-04-05 VITALS — SYSTOLIC BLOOD PRESSURE: 57 MMHG | DIASTOLIC BLOOD PRESSURE: 31 MMHG

## 2021-04-05 VITALS — DIASTOLIC BLOOD PRESSURE: 54 MMHG | SYSTOLIC BLOOD PRESSURE: 81 MMHG

## 2021-04-05 VITALS — HEIGHT: 64 IN | WEIGHT: 188.05 LBS | BODY MASS INDEX: 32.11 KG/M2

## 2021-04-05 VITALS — DIASTOLIC BLOOD PRESSURE: 35 MMHG | SYSTOLIC BLOOD PRESSURE: 51 MMHG

## 2021-04-05 VITALS — SYSTOLIC BLOOD PRESSURE: 102 MMHG | DIASTOLIC BLOOD PRESSURE: 72 MMHG

## 2021-04-05 VITALS — SYSTOLIC BLOOD PRESSURE: 108 MMHG | DIASTOLIC BLOOD PRESSURE: 78 MMHG

## 2021-04-05 VITALS — DIASTOLIC BLOOD PRESSURE: 97 MMHG | SYSTOLIC BLOOD PRESSURE: 111 MMHG

## 2021-04-05 VITALS — SYSTOLIC BLOOD PRESSURE: 66 MMHG | DIASTOLIC BLOOD PRESSURE: 31 MMHG

## 2021-04-05 VITALS — SYSTOLIC BLOOD PRESSURE: 114 MMHG | DIASTOLIC BLOOD PRESSURE: 40 MMHG

## 2021-04-05 VITALS — SYSTOLIC BLOOD PRESSURE: 95 MMHG | DIASTOLIC BLOOD PRESSURE: 51 MMHG

## 2021-04-05 VITALS — DIASTOLIC BLOOD PRESSURE: 54 MMHG | SYSTOLIC BLOOD PRESSURE: 106 MMHG

## 2021-04-05 VITALS — DIASTOLIC BLOOD PRESSURE: 10 MMHG | SYSTOLIC BLOOD PRESSURE: 27 MMHG

## 2021-04-05 VITALS — SYSTOLIC BLOOD PRESSURE: 103 MMHG | DIASTOLIC BLOOD PRESSURE: 47 MMHG

## 2021-04-05 VITALS — SYSTOLIC BLOOD PRESSURE: 84 MMHG | DIASTOLIC BLOOD PRESSURE: 39 MMHG

## 2021-04-05 VITALS — SYSTOLIC BLOOD PRESSURE: 66 MMHG | DIASTOLIC BLOOD PRESSURE: 46 MMHG

## 2021-04-05 VITALS — SYSTOLIC BLOOD PRESSURE: 75 MMHG | DIASTOLIC BLOOD PRESSURE: 42 MMHG

## 2021-04-05 VITALS — DIASTOLIC BLOOD PRESSURE: 90 MMHG | SYSTOLIC BLOOD PRESSURE: 123 MMHG

## 2021-04-05 VITALS — DIASTOLIC BLOOD PRESSURE: 37 MMHG | SYSTOLIC BLOOD PRESSURE: 56 MMHG

## 2021-04-05 VITALS — SYSTOLIC BLOOD PRESSURE: 96 MMHG | DIASTOLIC BLOOD PRESSURE: 47 MMHG

## 2021-04-05 VITALS — DIASTOLIC BLOOD PRESSURE: 53 MMHG | SYSTOLIC BLOOD PRESSURE: 107 MMHG

## 2021-04-05 VITALS — SYSTOLIC BLOOD PRESSURE: 51 MMHG | DIASTOLIC BLOOD PRESSURE: 35 MMHG

## 2021-04-05 VITALS — SYSTOLIC BLOOD PRESSURE: 102 MMHG | DIASTOLIC BLOOD PRESSURE: 77 MMHG

## 2021-04-05 VITALS — SYSTOLIC BLOOD PRESSURE: 76 MMHG | DIASTOLIC BLOOD PRESSURE: 55 MMHG

## 2021-04-05 VITALS — SYSTOLIC BLOOD PRESSURE: 99 MMHG | DIASTOLIC BLOOD PRESSURE: 90 MMHG

## 2021-04-05 VITALS — DIASTOLIC BLOOD PRESSURE: 80 MMHG | SYSTOLIC BLOOD PRESSURE: 95 MMHG

## 2021-04-05 DIAGNOSIS — I48.92: ICD-10-CM

## 2021-04-05 DIAGNOSIS — Z79.899: ICD-10-CM

## 2021-04-05 DIAGNOSIS — G40.909: ICD-10-CM

## 2021-04-05 DIAGNOSIS — F32.9: ICD-10-CM

## 2021-04-05 DIAGNOSIS — G47.33: ICD-10-CM

## 2021-04-05 DIAGNOSIS — J45.909: ICD-10-CM

## 2021-04-05 DIAGNOSIS — E78.5: ICD-10-CM

## 2021-04-05 DIAGNOSIS — E86.0: ICD-10-CM

## 2021-04-05 DIAGNOSIS — F41.9: ICD-10-CM

## 2021-04-05 DIAGNOSIS — G89.29: ICD-10-CM

## 2021-04-05 DIAGNOSIS — E66.01: ICD-10-CM

## 2021-04-05 DIAGNOSIS — L89.90: ICD-10-CM

## 2021-04-05 DIAGNOSIS — M79.7: ICD-10-CM

## 2021-04-05 DIAGNOSIS — M19.90: ICD-10-CM

## 2021-04-05 DIAGNOSIS — I95.1: ICD-10-CM

## 2021-04-05 DIAGNOSIS — F03.90: ICD-10-CM

## 2021-04-05 DIAGNOSIS — B37.49: ICD-10-CM

## 2021-04-05 DIAGNOSIS — E83.42: ICD-10-CM

## 2021-04-05 DIAGNOSIS — I12.9: ICD-10-CM

## 2021-04-05 DIAGNOSIS — R65.21: ICD-10-CM

## 2021-04-05 DIAGNOSIS — Z90.49: ICD-10-CM

## 2021-04-05 DIAGNOSIS — E78.00: ICD-10-CM

## 2021-04-05 DIAGNOSIS — E83.52: ICD-10-CM

## 2021-04-05 DIAGNOSIS — E87.6: ICD-10-CM

## 2021-04-05 DIAGNOSIS — I48.0: ICD-10-CM

## 2021-04-05 DIAGNOSIS — M81.0: ICD-10-CM

## 2021-04-05 DIAGNOSIS — E11.22: ICD-10-CM

## 2021-04-05 DIAGNOSIS — E11.649: ICD-10-CM

## 2021-04-05 DIAGNOSIS — I25.10: ICD-10-CM

## 2021-04-05 DIAGNOSIS — J96.00: ICD-10-CM

## 2021-04-05 DIAGNOSIS — E87.0: ICD-10-CM

## 2021-04-05 DIAGNOSIS — D64.9: ICD-10-CM

## 2021-04-05 DIAGNOSIS — E03.9: ICD-10-CM

## 2021-04-05 DIAGNOSIS — N18.30: ICD-10-CM

## 2021-04-05 DIAGNOSIS — I49.01: ICD-10-CM

## 2021-04-05 DIAGNOSIS — A41.9: Primary | ICD-10-CM

## 2021-04-05 DIAGNOSIS — E43: ICD-10-CM

## 2021-04-05 DIAGNOSIS — K21.9: ICD-10-CM

## 2021-04-05 LAB
ALBUMIN SERPL-MCNC: 1.2 G/DL (ref 3.4–5)
ALBUMIN SERPL-MCNC: 1.4 G/DL (ref 3.4–5)
ALBUMIN/GLOB SERPL: 0.4 {RATIO} (ref 1–1.7)
ALP SERPL-CCNC: 330 U/L (ref 46–116)
ALP SERPL-CCNC: 407 U/L (ref 46–116)
ALT SERPL-CCNC: 102 U/L (ref 14–59)
ALT SERPL-CCNC: 82 U/L (ref 14–59)
AMPHETAMINE/METHAMPHETAMINE: (no result)
ANION GAP SERPL CALC-SCNC: 7 MMOL/L (ref 6–14)
ANION GAP SERPL CALC-SCNC: 7 MMOL/L (ref 6–14)
APTT PPP: YELLOW S
AST SERPL-CCNC: 130 U/L (ref 15–37)
AST SERPL-CCNC: 184 U/L (ref 15–37)
BACTERIA #/AREA URNS HPF: 0 /HPF
BARBITURATES UR-MCNC: (no result) UG/ML
BASOPHILS # BLD AUTO: 0.1 X10^3/UL (ref 0–0.2)
BASOPHILS # BLD AUTO: 0.1 X10^3/UL (ref 0–0.2)
BASOPHILS NFR BLD: 1 % (ref 0–3)
BASOPHILS NFR BLD: 1 % (ref 0–3)
BENZODIAZ UR-MCNC: (no result) UG/L
BILIRUB DIRECT SERPL-MCNC: 0.2 MG/DL (ref 0–0.2)
BILIRUB SERPL-MCNC: 0.5 MG/DL (ref 0.2–1)
BILIRUB SERPL-MCNC: 0.6 MG/DL (ref 0.2–1)
BILIRUB UR QL STRIP: (no result)
BUN/CREAT SERPL: 12 (ref 6–20)
CA-I SERPL ISE-MCNC: 14 MG/DL (ref 7–20)
CA-I SERPL ISE-MCNC: 15 MG/DL (ref 7–20)
CALCIUM SERPL-MCNC: 12.2 MG/DL (ref 8.5–10.1)
CALCIUM SERPL-MCNC: 12.5 MG/DL (ref 8.5–10.1)
CANNABINOIDS UR-MCNC: (no result) UG/L
CHLORIDE SERPL-SCNC: 110 MMOL/L (ref 98–107)
CHLORIDE SERPL-SCNC: 111 MMOL/L (ref 98–107)
CO2 SERPL-SCNC: 28 MMOL/L (ref 21–32)
CO2 SERPL-SCNC: 30 MMOL/L (ref 21–32)
COCAINE UR-MCNC: (no result) NG/ML
CREAT SERPL-MCNC: 1.3 MG/DL (ref 0.6–1)
CREAT SERPL-MCNC: 1.3 MG/DL (ref 0.6–1)
EOSINOPHIL NFR BLD: 0 X10^3/UL (ref 0–0.7)
EOSINOPHIL NFR BLD: 0.5 X10^3/UL (ref 0–0.7)
EOSINOPHIL NFR BLD: 1 % (ref 0–3)
EOSINOPHIL NFR BLD: 5 % (ref 0–3)
ERYTHROCYTE [DISTWIDTH] IN BLOOD BY AUTOMATED COUNT: 19.2 % (ref 11.5–14.5)
ERYTHROCYTE [DISTWIDTH] IN BLOOD BY AUTOMATED COUNT: 19.8 % (ref 11.5–14.5)
FIBRINOGEN PPP-MCNC: CLEAR MG/DL
GFR SERPLBLD BASED ON 1.73 SQ M-ARVRAT: 40.6 ML/MIN
GFR SERPLBLD BASED ON 1.73 SQ M-ARVRAT: 40.6 ML/MIN
GLOBULIN SER-MCNC: 3.3 G/DL (ref 2.2–3.8)
GLUCOSE SERPL-MCNC: 58 MG/DL (ref 70–99)
GLUCOSE SERPL-MCNC: 90 MG/DL (ref 70–99)
GLUCOSE UR STRIP-MCNC: (no result) MG/DL
HCT VFR BLD CALC: 30.9 % (ref 36–47)
HCT VFR BLD CALC: 36 % (ref 36–47)
HGB BLD-MCNC: 11.1 G/DL (ref 12–15.5)
HGB BLD-MCNC: 9.9 G/DL (ref 12–15.5)
HYALINE CASTS #/AREA URNS LPF: (no result) /HPF
LIPASE: 71 U/L (ref 73–393)
LYMPHOCYTES # BLD: 0.9 X10^3/UL (ref 1–4.8)
LYMPHOCYTES # BLD: 4.7 X10^3/UL (ref 1–4.8)
LYMPHOCYTES NFR BLD AUTO: 17 % (ref 24–48)
LYMPHOCYTES NFR BLD AUTO: 50 % (ref 24–48)
MAGNESIUM SERPL-MCNC: 1.5 MG/DL (ref 1.8–2.4)
MCH RBC QN AUTO: 26 PG (ref 25–35)
MCH RBC QN AUTO: 26 PG (ref 25–35)
MCHC RBC AUTO-ENTMCNC: 31 G/DL (ref 31–37)
MCHC RBC AUTO-ENTMCNC: 32 G/DL (ref 31–37)
MCV RBC AUTO: 81 FL (ref 79–100)
MCV RBC AUTO: 83 FL (ref 79–100)
METHADONE SERPL-MCNC: (no result) NG/ML
MONO #: 0.1 X10^3/UL (ref 0–1.1)
MONO #: 0.8 X10^3/UL (ref 0–1.1)
MONOCYTES NFR BLD: 1 % (ref 0–9)
MONOCYTES NFR BLD: 8 % (ref 0–9)
NEUT #: 3.3 X10^3UL (ref 1.8–7.7)
NEUT #: 4.4 X10^3UL (ref 1.8–7.7)
NEUTROPHILS NFR BLD AUTO: 35 % (ref 31–73)
NEUTROPHILS NFR BLD AUTO: 80 % (ref 31–73)
NITRITE UR QL STRIP: (no result)
OPIATES UR-MCNC: (no result) NG/ML
PCP SERPL-MCNC: (no result) MG/DL
PLATELET # BLD AUTO: 391 X10^3/UL (ref 140–400)
PLATELET # BLD AUTO: 462 X10^3/UL (ref 140–400)
POTASSIUM SERPL-SCNC: 3.2 MMOL/L (ref 3.5–5.1)
POTASSIUM SERPL-SCNC: 4.1 MMOL/L (ref 3.5–5.1)
PROT SERPL-MCNC: 4.7 G/DL (ref 6.4–8.2)
PROT SERPL-MCNC: 5.1 G/DL (ref 6.4–8.2)
RBC # BLD AUTO: 3.81 X10^6/UL (ref 3.5–5.4)
RBC # BLD AUTO: 4.35 X10^6/UL (ref 3.5–5.4)
SODIUM SERPL-SCNC: 146 MMOL/L (ref 136–145)
SODIUM SERPL-SCNC: 147 MMOL/L (ref 136–145)
SP GR UR STRIP: >=1.03
SQUAMOUS #/AREA URNS LPF: (no result) /LPF
UROBILINOGEN UR-MCNC: 0.2 MG/DL
WBC # BLD AUTO: 5.5 X10^3/UL (ref 4–11)
WBC # BLD AUTO: 9.4 X10^3/UL (ref 4–11)
WBC #/AREA URNS HPF: >40 /HPF (ref 0–4)
YEAST #/AREA URNS HPF: PRESENT /HPF

## 2021-04-05 PROCEDURE — 80076 HEPATIC FUNCTION PANEL: CPT

## 2021-04-05 PROCEDURE — 84484 ASSAY OF TROPONIN QUANT: CPT

## 2021-04-05 PROCEDURE — 83605 ASSAY OF LACTIC ACID: CPT

## 2021-04-05 PROCEDURE — 83970 ASSAY OF PARATHORMONE: CPT

## 2021-04-05 PROCEDURE — 84443 ASSAY THYROID STIM HORMONE: CPT

## 2021-04-05 PROCEDURE — 70450 CT HEAD/BRAIN W/O DYE: CPT

## 2021-04-05 PROCEDURE — 84478 ASSAY OF TRIGLYCERIDES: CPT

## 2021-04-05 PROCEDURE — 82947 ASSAY GLUCOSE BLOOD QUANT: CPT

## 2021-04-05 PROCEDURE — 94002 VENT MGMT INPAT INIT DAY: CPT

## 2021-04-05 PROCEDURE — 80053 COMPREHEN METABOLIC PANEL: CPT

## 2021-04-05 PROCEDURE — 83880 ASSAY OF NATRIURETIC PEPTIDE: CPT

## 2021-04-05 PROCEDURE — 85610 PROTHROMBIN TIME: CPT

## 2021-04-05 PROCEDURE — 93005 ELECTROCARDIOGRAM TRACING: CPT

## 2021-04-05 PROCEDURE — 87040 BLOOD CULTURE FOR BACTERIA: CPT

## 2021-04-05 PROCEDURE — 80048 BASIC METABOLIC PNL TOTAL CA: CPT

## 2021-04-05 PROCEDURE — 96374 THER/PROPH/DIAG INJ IV PUSH: CPT

## 2021-04-05 PROCEDURE — 85025 COMPLETE CBC W/AUTO DIFF WBC: CPT

## 2021-04-05 PROCEDURE — 85007 BL SMEAR W/DIFF WBC COUNT: CPT

## 2021-04-05 PROCEDURE — 86140 C-REACTIVE PROTEIN: CPT

## 2021-04-05 PROCEDURE — 87086 URINE CULTURE/COLONY COUNT: CPT

## 2021-04-05 PROCEDURE — 36415 COLL VENOUS BLD VENIPUNCTURE: CPT

## 2021-04-05 PROCEDURE — 83735 ASSAY OF MAGNESIUM: CPT

## 2021-04-05 PROCEDURE — 36569 INSJ PICC 5 YR+ W/O IMAGING: CPT

## 2021-04-05 PROCEDURE — 87186 SC STD MICRODIL/AGAR DIL: CPT

## 2021-04-05 PROCEDURE — 82550 ASSAY OF CK (CPK): CPT

## 2021-04-05 PROCEDURE — 82803 BLOOD GASES ANY COMBINATION: CPT

## 2021-04-05 PROCEDURE — 72125 CT NECK SPINE W/O DYE: CPT

## 2021-04-05 PROCEDURE — 87205 SMEAR GRAM STAIN: CPT

## 2021-04-05 PROCEDURE — 71045 X-RAY EXAM CHEST 1 VIEW: CPT

## 2021-04-05 PROCEDURE — 87106 FUNGI IDENTIFICATION YEAST: CPT

## 2021-04-05 PROCEDURE — 87077 CULTURE AEROBIC IDENTIFY: CPT

## 2021-04-05 PROCEDURE — 83690 ASSAY OF LIPASE: CPT

## 2021-04-05 PROCEDURE — 85730 THROMBOPLASTIN TIME PARTIAL: CPT

## 2021-04-05 PROCEDURE — P9041 ALBUMIN (HUMAN),5%, 50ML: HCPCS

## 2021-04-05 PROCEDURE — 80307 DRUG TEST PRSMV CHEM ANLYZR: CPT

## 2021-04-05 PROCEDURE — 94640 AIRWAY INHALATION TREATMENT: CPT

## 2021-04-05 PROCEDURE — 81001 URINALYSIS AUTO W/SCOPE: CPT

## 2021-04-05 PROCEDURE — 36600 WITHDRAWAL OF ARTERIAL BLOOD: CPT

## 2021-04-05 PROCEDURE — 80170 ASSAY OF GENTAMICIN: CPT

## 2021-04-05 PROCEDURE — 82330 ASSAY OF CALCIUM: CPT

## 2021-04-05 RX ADMIN — Medication SCH MG: at 09:30

## 2021-04-05 RX ADMIN — POTASSIUM CHLORIDE SCH MEQ: 1500 TABLET, EXTENDED RELEASE ORAL at 09:30

## 2021-04-05 RX ADMIN — POTASSIUM CHLORIDE SCH MEQ: 1500 TABLET, EXTENDED RELEASE ORAL at 21:00

## 2021-04-05 RX ADMIN — ENOXAPARIN SODIUM SCH MG: 100 INJECTION SUBCUTANEOUS at 13:40

## 2021-04-05 RX ADMIN — METOPROLOL TARTRATE SCH MG: 50 TABLET, FILM COATED ORAL at 21:00

## 2021-04-05 RX ADMIN — BUDESONIDE SCH MG: 0.5 SUSPENSION RESPIRATORY (INHALATION) at 09:40

## 2021-04-05 RX ADMIN — DICLOFENAC SODIUM SCH APP: 10 GEL TOPICAL at 21:00

## 2021-04-05 RX ADMIN — VITAMIN D, TAB 1000IU (100/BT) SCH UNIT: 25 TAB at 09:30

## 2021-04-05 RX ADMIN — METOPROLOL TARTRATE SCH MG: 50 TABLET, FILM COATED ORAL at 15:58

## 2021-04-05 RX ADMIN — DEXTROSE SCH MLS/HR: 5 SOLUTION INTRAVENOUS at 10:23

## 2021-04-05 RX ADMIN — IPRATROPIUM BROMIDE AND ALBUTEROL SULFATE SCH ML: .5; 3 SOLUTION RESPIRATORY (INHALATION) at 09:31

## 2021-04-05 RX ADMIN — AMIODARONE HYDROCHLORIDE PRN MLS/HR: 50 INJECTION, SOLUTION INTRAVENOUS at 20:34

## 2021-04-05 RX ADMIN — NIACIN SCH MG: 500 TABLET, FILM COATED, EXTENDED RELEASE ORAL at 09:30

## 2021-04-05 RX ADMIN — IPRATROPIUM BROMIDE AND ALBUTEROL SULFATE SCH ML: .5; 3 SOLUTION RESPIRATORY (INHALATION) at 20:10

## 2021-04-05 RX ADMIN — BUDESONIDE SCH MG: 0.5 SUSPENSION RESPIRATORY (INHALATION) at 19:58

## 2021-04-05 RX ADMIN — DEXTROSE SCH MLS/HR: 5 SOLUTION INTRAVENOUS at 18:00

## 2021-04-05 RX ADMIN — PIPERACILLIN SODIUM AND TAZOBACTAM SODIUM SCH MLS/HR: 4; .5 INJECTION, POWDER, LYOPHILIZED, FOR SOLUTION INTRAVENOUS at 21:36

## 2021-04-05 RX ADMIN — IPRATROPIUM BROMIDE AND ALBUTEROL SULFATE SCH ML: .5; 3 SOLUTION RESPIRATORY (INHALATION) at 16:00

## 2021-04-05 RX ADMIN — CHOLESTYRAMINE SCH GM: 4 POWDER, FOR SUSPENSION ORAL at 09:30

## 2021-04-05 RX ADMIN — LIDOCAINE SCH PATCH: 50 PATCH CUTANEOUS at 13:22

## 2021-04-05 RX ADMIN — IPRATROPIUM BROMIDE AND ALBUTEROL SULFATE SCH ML: .5; 3 SOLUTION RESPIRATORY (INHALATION) at 08:00

## 2021-04-05 NOTE — NUR
pt transferred over to ICU-2 from [address] [place] [address], Af[facility] RVR, pt had been given digoixin 0.5mg IVP on 
med surg floor.  This RN spoke with  obtained orders for Lopressor 2.5mg IVP, 
Magnesium sulfate 2 grams IV and move pt to ICU.  Pt's blood pressure remains low, requiring 
fluid bolus of 500mls, 250mls to maintain adequate BP. Wound care was here to see pt, per 
ALBIN Mcbride most of her wounds are going to need to be debridment and they suggested pt may 
need to return to [facility] where the surgeons that took care of her are.  Report given to ALBIN Hicks and ALBIN Agrawal.  Fabiola will call Dr. Hernandez for further orders.

## 2021-04-05 NOTE — RAD
XR CHEST 1V



History: Reason: dyspnea / Spl. Instructions:  / History: 



Comparison: December 29, 2017



Findings:

Small left pleural effusion with adjacent atelectasis. No consolidation. No pneumothorax. Normal hear
t size.



Impression: 

1.  Small left pleural effusion with adjacent atelectasis.



Electronically signed by: Heladio Rossi DO (4/5/2021 5:48 AM) [id number]

## 2021-04-05 NOTE — HP
ADMIT DATE:  04/05/2021



HISTORY OF PRESENT ILLNESS:  A 71-year-old female who came in with altered

mental status through the [facility].  She

was admitted there on 03/24/2021.  She has been in the hospital for 2-3 months

now.  She has had extensive abdominal surgery and then apparently had some

complications from that and was sent to [facility]; and then after

stabilization there, she was sent here to the [facility].  The patient

had a glucose found to be 46, was given 15 grams of glucose.  The patient was

more alert and responsive.  The patient was admitted to the hospital for further

evaluation of her multiplicity of medical problems.  Also found in the process

of workup, the patient had sepsis with a greater than 40 white blood cell count.

 She had hypotension as well as hypercalcemia with an elevated lactic acid.  The

patient was admitted for further evaluation and treatment of these

multiplicities of medical problems with IV antibiotic therapy.  While on the

floor, the patient began to go into sinus tachycardia.  Consultation with

Cardiology.  The patient was given metoprolol 5 mg IV as well as Lanoxin 0.25 mg

IV.  She was not taking anything orally.  A PICC line had been placed in her

right upper arm, as her IV blew.  The patient was also given D5W and would be

started on TPN, as her albumin levels were critically low in the 1.3 range.



PAST MEDICAL HISTORY:  Includes that of morbid obesity, chronic lymphedema, gait

disorder, osteoarthritis, hypertension, anemia, diverticulitis, asthma,

bronchitis, seizure disorders, paroxysmal atrial fibrillation, peritoneal

abscess, syncope, GI bleeds, orthostatic hypotension, fracture of distal fibula,

hyperlipidemia, coronary artery disease, elevated cholesterol, Von Willebrand's

disease, protein C deficiency, mild vasculitis, endocarditis, osteoarthrosis,

dysmetabolic syndrome X, venous insufficiency, sepsis, falls, contusion, sepsis,

obstructive sleep apnea, osteoporosis, chronic pain, colitis, degenerative disk

disease, ____ fibromyalgia, polyarthralgia, and diverticulitis.



ALLERGIES:  ____ VANCOMYCIN SENSITIVITY TO LEVAQUIN, LANOLIN, BENICAR, LACTOSE

INTOLERANT.



FAMILY HISTORY:  Unremarkable at this time.  The patient initially was admitted

back in early February with a bowel obstruction.  She had surgery and multiple

complications related to that.  She has had poor food intake, weight loss, and

progressive weakness and the patient was in critical condition and moved her to

the ICU as indicated.



MEDICATIONS:  List included loratadine, meropenem 500 mg q.6, micafungin,

diclofenac, heparin, fish oil, metoprolol 50 mg t.i.d., aspirin, hydrocodone,

acetaminophen, Keppra 500 b.i.d., Remeron 15 mg at bedtime, lorazepam 1 mg,

furosemide 40 mg, Singulair 10 mg, magnesium, Protonix, Lidoderm, and

multivitamin.



SOCIAL HISTORY:  No smoking, alcohol or drug use.  The patient is a full code.



REVIEW OF SYSTEMS:  The patient is not able to give any type of history.  She is

markedly sedated and she is more or less in a sedate mentation.



PHYSICAL EXAMINATION:

GENERAL:  This is an ill-appearing white female.  She has multiple sores and

ulcerations.  See photographs.

VITAL SIGNS:  Blood pressure 114/40 down to 99/74, respiratory rate 30 from 16,

up to 156 pulse, 3 liters at 100% oxygen.

HEENT:  The patient's head was atraumatic.  Eyes were PERRLA.  Mouth and throat

were normal.

NECK:  Supple.

LUNGS:  Diminished throughout.  Poor movement of air, especially in the bases.

CARDIOVASCULAR:  Tachycardic, sinus.

ABDOMEN:  Protuberant, soft.  Colostomy bag in place with drainage.

EXTREMITIES:  Without clubbing or cyanosis.  Chronic stasis dermatitis.  Chronic

hyperpigmentation.  Pulses noted distally.

NEUROLOGIC:  Arousable, but very sedate.  Poor reflexes throughout.



LABORATORY DATA:  Show white count of 5.5, hemoglobin 9 and 30.  The patient's

platelets are stable.  The patient's chemistries are 147 and 3.2.  As noted,

blood sugar was low, has come up to 90, calcium of 12.2, but that may be altered

by her low albumin.



IMPRESSION:  Sepsis, elevated liver enzymes, mild chronic renal insufficiency,

stage III; severe hypoalbuminemia, hypomagnesemia, hypoglycemia, sepsis, urinary

tract infection, organism unspecified; severe dehydration, poor urinary output.



PLAN:  The patient will be monitored in the ICU.  Given boluses of fluid.  We

will continue to monitor her urinary output.  She has drainage tubes as well in

her gluteal area as well as multiple bruises and abrasions as noted. 

Significant malnutrition, severe.  The patient will be monitored carefully in

the ICU and make further evaluation with Cardiology consultation.





______________________________

KELLY TRUJILLO MD



DR:  HAILEY/yuridia  JOB#:  [id number] / [id number]

DD:  04/05/2021 18:32  DT:  04/05/2021 19:06

## 2021-04-05 NOTE — NUR
Pt admitted to unit at 0800 from ED. upon assessment multiple wounds were identified and 
documented. wound drain noted also.

## 2021-04-05 NOTE — NUR
Pharmacy Aminoglycoside Dosing Note



S: Consulted to monitor and dose Gentamicin started 04/05/21





O:ISSA RODRIGUEZ is a 71 year old F with 

Sepsis 



Height: 5 feet, 4 inches

Weight: 83.2 kg

Ideal Weight: 54.70 

Adjusted Weight: 66.10 

Dosing Weight:Adjusted



Other Antibiotics: 

Zosyn



LABS:

BUN: 14 

SCr:1.3 

CrCl: 42.6 

WBC: 9.3 

Platelet: 462 

Tmax (past 24 hours): 

I/O: 



Microbiology: 



Drug Levels:

Last Peak:  on  at 

Last Trough:  on  at 

Last Random Level:  on  at 



Last dose given  at 







A: Based on Physician request for dosing





P: 1. Begin Gentamicin 5 mg/kg IV q36h

    2.Follow up random levels on 04/05/21 at 2130 

   3. Pharmacy will continue to monitor, follow and adjust therapy as needed.

 

 RENY BONILLA,  04/05/21 [id number]

## 2021-04-05 NOTE — PHYS DOC
Past History


Past Medical History:  Abscess, Anemia, Anxiety, Arthritis, Asthma, Bronchitis, 

CAD, Constipation, Dementia, Depression, Diverticulitis, Diabetes, Fibromyalgia,

GERD, High Cholesterol, Heart Disease, Hypertension, Hypothyroid, Hypotension, 

Seizure, UTI, Other


Past Medical History


Sleep Apnea, Malnutrition, decubitus ulcers, Hx. Morbid Obesity


Past Surgical History


Bowel obstruction 2021


Smoking:  Non-smoker


Alcohol Use:  Occasionally


Drug Use:  None





General Adult


HPI:


HPI:


".. Maybe its not bad for you....".."  For me it was bad..."





Patient is a 71 year old female who presents with hx of altered mental status.  

Patient currently a resident at [facility] since 3/24/2021. 

Patient admitted for rehab, wound care and physical therapy.  Patient primary 

care at the nursing home as Dr. Flores.  Pt. follows with Dr. Trujillo 

when out pt. patient reportedly ambulatory with mild gait disorder and 

interactive at nursing home.  However on checking patient today she was found to

be very confused and not always at her baseline mental status.  On paramedic 

arrival her glucose was found to be 46 was given 15 g of oral glucose.  Patient 

really became much more alert and responsive.  Patient wrote given additional 15

g of glucose, just prior to arrival her glucose check was again found to be 50 .

 Patient on arrival does answer questions reportedly near her baseline mental 

status.  There is history of possible falls while at the nursing home. 





 Patient has past medical history of morbid obesity, lymphedema, gait disorder, 

osteoarthritis, hypertension, anemia, diverticulitis, asthma, bronchitis, 

seizure disorder, proximal atrial fibrillation, peritoneal abscesses, syncope, G

I bleeds, orthostatic hypotension, fractured distal fibular, hyperlipidemia, 

coronary artery disease, elevated cholesterol, von Willebrand disease, protein C

deficiency, mild vasculitis, endocarditis, osteoarthrosis cyst, disc metabolic 

syndrome X, venous insufficiency, sepsis, falls, contusions, sepsis, obstructive

sleep apnea, osteoporosis, chronic pain, colitis, degenerative disc disease, 

[place] fibromyalgia, polyarthralgia, diverticulitis, allergy to poinsettia, 

vancomycin sensitivity and rash, Levaquin, lanolin, Benicar, lactose 

intolerance, dementia and generalized deconditioned


.


 advised  she is currently back near her baseline mentally on his arrival

to ED.   Pt.reported had surgery in 2021 at [facility], for a bowel obstruction.  

Patient post surgery had developed a altered mental status.  Her mental status 

and mentation  has never returned to prior level. Pt. has seemed to developed 

dementia.  Pt. has had poor intake of food or nutrition ever since the 

hospitalization in Feb, with gradual Wt. loss and progressive generalized 

weakness.





Review of Systems:


Review of Systems:


Constitutional:  Denies fever or chills 


Eyes:  Denies change in visual acuity 


HENT:  Denies nasal congestion or sore throat 


Respiratory:  Denies cough or shortness of breath 


Cardiovascular:  Denies chest pain or edema 


GI:  Denies abdominal pain, nausea, vomiting, bloody stools or diarrhea 


: Denies dysuria 


Musculoskeletal: Patient complains of generalized muscular weakness and joint 

pain


Integument: Complains of skin ulcers


Neurologic:  Denies headache, focal weakness or sensory changes 


Endocrine:  Denies polyuria or polydipsia 


Lymphatic:  Denies swollen glands 


Psychiatric: Complains of anxiety





Family History:


Family History:


Noncontributory to presentation





Current Medications:


Current Meds:





Current Medications








 Medications


  (Trade)  Dose


 Ordered  Sig/Cheko  Start Time


 Stop Time Status Last Admin


Dose Admin


 


 Dextrose


  (Dextrose


 50%-Water Syringe)  25 gm  STK-MED ONCE  21 04:29


 21 04:29 DC  














Allergies:


Allergies:





Allergies








Coded Allergies Type Severity Reaction Last Updated Verified


 


  Iodinated Contrast- Oral and IV Dye Allergy Severe  14 Yes


 


  iodine Allergy Severe Anaphylaxis 14 Yes


 


  vancomycin Allergy Severe  14 Yes


 


  bacitracin Allergy Intermediate  17 Yes


 


  levofloxacin Allergy Intermediate  17 Yes


 


  shellfish derived Allergy Intermediate  17 Yes











Physical Exam:


PE:





Constitutional: Chronically ill in appearance 


HENT: Normocephalic, atraumatic, bilateral external ears normal, oropharynx 

moist, no oral exudates, nose normal. []


Eyes: PERRLA, EOMI, conjunctiva normal, no discharge. [] 


Neck: Normal range of motion, no tenderness, supple, no stridor. [] 


Cardiovascular: Tachycardia heart rate regular rhythm, PMI to left.  Monitor 

shows occasional PVC.


Lungs & Thorax:  Bilateral breath sounds equal apex with scattered wheezes on 

auscultation [patient] does have basilar crackles.


Abdomen: Bowel sounds normal, soft, no tenderness, no masses, no pulsatile 

masses.  Morbidly obese


Skin: Warm, dry, areas of ecchymosis,,, poor turgor, decub's venous stasis choudhury

es


Back: No tenderness, no CVA tenderness.  Decub's


Extremities: Reports generalized joint tenderness, no cyanosis, no clubbing, 

does move all extremities on request.  Marked edema lower limbs both hard and 

soft.  Venous stasis changes


Neurologic: Alert and oriented X 3, n moves all extremities on request, 

decreased lower limb sensation,, no focal deficits from baseline.


Psychologic: Affect anxious, does appear to have some baseline dementia and 

memory issues,





Current Patient Data:


Labs:





                                Laboratory Tests








Test


 21


04:32


 


Glucose (Fingerstick)


 41 mg/dL


(70-99)  L











EKG:


EKG:


My interpretation EKG shows a sinus tachycardia 127 bpm.  Is low voltage.  There

is leftward axis changes.  There is some nonspecific anterior septal changes.  

No findings of acute STEMI of contralateral changes.  Overall morphology an 

abnormal EKG []





Radiology/Procedures:


Radiology/Procedures:


[facility] [address], [place] [address]


                                 [phone]


                                        


                                 IMAGING REPORT





                                     Signed





PATIENT: ISSA RODRIGUEZ   ACCOUNT: [id number]     MRN#: [id number]


: 1950           LOCATION: ER              AGE: 71


SEX: F                    EXAM DT: 21         ACCESSION#: [id number]


STATUS: REG ER            ORD. PHYSICIAN: YEE CANADA MD


REASON: dyspnea


PROCEDURE: PORTABLE CHEST 1V





XR CHEST 1V





History: Reason: dyspnea / Spl. Instructions:  / History: 





Comparison: 2017





Findings:


Small left pleural effusion with adjacent atelectasis. No consolidation. No 

pneumothorax. Normal heart size.





Impression: 


1.  Small left pleural effusion with adjacent atelectasis.





Electronically signed by: Heladio Rossi DO (2021 5:48 AM) [id number]














DICTATED AND SIGNED BY:     HELADIO ROSSI DO


DATE:     21 0546





CC: KELLY TRUJILLO MD; YEE CANADA MD ~MTH0 0


[][facility] [address], [place] [address] [phone]


                                        


                                 IMAGING REPORT





                                     Signed





PATIENT: ISSA RODRIGUEZ   ACCOUNT: [id number]     MRN#: [id number]


: 1950           LOCATION: ER              AGE: 71


SEX: F                    EXAM DT: 21         ACCESSION#: [id number]


STATUS: REG ER            ORD. PHYSICIAN: YEE CANADA MD


REASON: altered mental status, H/O OSTEOARTHRITIS, DDD.


PROCEDURE: CT HEAD AND CERVICAL SPINE WO





CT HEAD AND C-SPINE WO





History: Reason: altered mental status, H/O OSTEOARTHRITIS, DDD. / Spl. 

Instructions:  / History: 





Comparison: 2017





Technique: Noncontrast CT imaging was performed of the head and cervical spine. 

 Coronal and sagittal reconstructions were performed.





Exposure: One or more of the following individualized dose reduction techniques 

were utilized for this examination:  


1. Automated exposure control  


2. Adjustment of the mA and/or kV according to patient size  


3. Use of iterative reconstruction technique.





Findings: Motion degraded evaluation.


Head CT: No intracranial hemorrhage. No mass effect. Mild prominence of the 

lateral ventricles, unchanged and likely related to central brain parenchymal 

volume loss.





Mild brain parenchymal volume loss. Mild foci of decreased attenuation within 

the hemispheric white matter, most often due to chronic microvascular ischemia. 

Right inferior basal ganglia prominent perivascular space.





Imaged orbits are unremarkable. Imaged paranasal sinuses and mastoid air cells 

are clear. No acute calvarial fracture.





Cervical spine CT:


Slight retrolisthesis C3 on C4 and C4 on C5. Normal vertebral body height. No 

fracture.





Moderate degenerative disc changes most prominent C3-C4 and C4-C5 as well as C6-

C7. Multilevel facet arthropathy. Advanced right C1-C2 degenerative changes. No 

high-grade canal narrowing. Multilevel neuroforaminal narrowing.





Basilar impression, unchanged.





Sclerotic lesion within C4 right facet, likely bone island.





Soft tissues unremarkable.





Impression:


Head CT:


1.  Motion degraded evaluation. No definite acute intracranial abnormality. If 

persistent clinical concern, recommend follow-up.





Cervical spine CT:


1.  No acute fracture or subluxation of the cervical spine.


2.  Moderate multilevel cervical spondylosis.





Electronically signed by: Heladio Rossi DO (2021 6:00 AM) [id number]











Heart Score:


C/O Chest Pain:  N/A


HEART Score for Chest Pain:  








HEART Score for Chest Pain Response (Comments) Value


 


History Moderately Suspicious 1


 


ECG Nonspecific Repolarizatio 1


 


Age > 65 2


 


Risk Factors                            1 or 2 Risk Factors 1


 


Troponin < Normal Limit 0


 


Total  5








Risk Factors:


Risk Factors:  DM, Current or recent (<one month) smoker, HTN, HLP, family 

history of CAD, obesity.


Risk Scores:


Score 0 - 3:  2.5% MACE over next 6 weeks - Discharge Home


Score 4 - 6:  20.3% MACE over next 6 weeks - Admit for Clinical Observation


Score 7 - 10:  72.7% MACE over next 6 weeks - Early Invasive Strategies





Course & Med Decision Making:


Course & Med Decision Making


Pertinent Labs and Imaging studies reviewed. (See chart for details)





Discussed Hx., presentation,testing and tx. plan with Dr. Trujillo-  Admit to 

his service.





Impression:





1. Acute Mental Status Change


2.  Persistent hypoglycemia


3.  Anemia hemoglobin 11.1


4.  Critical calcium 12.5


5.  Elevated CRP= 93.3


6.  Severe malnutrition albumin 1.2


7.  Hypernatremia 146


8.  Elevated AST/ALT/alk phos 184/102/407


9.  Decubitus ulcers


10.  Multiple medical issues


[]





Dragon Disclaimer:


Dragon Disclaimer:


This electronic medical record was generated, in whole or in part, using a voice

 recognition dictation system.





Departure


Departure:


Referrals:  


KELLY TRUJILLO MD (PCP)





Dragon Disclaimer


This chart was dictated in whole or in part using Voice Recognition software in 

a busy, high-work load, and often noisy Emergency Department environment.  It 

may contain unintended and wholly unrecognized errors or omissions.











YEE CANADA MD            2021 04:36

## 2021-04-05 NOTE — NUR
Order Verified Yes

 

Consent signed Yes

 

Previous PICC placement Yes 

 

Past Medical/Surgical history and current diagnosis reviewed Yes



Patient Medical /Surgical History Related to PICC line placement 

None



Special considerations for PICC line placement 

Appears left arm infiltration with malpositioned midline. 



 

PICC placement indication     

Long term antibiotic usage, 

Poor peripheral intravenous access 



Name of PICC Nurse Malou Rodriguez RN

## 2021-04-05 NOTE — EKG
[facility] [address], [place] [address]

Test Date:    2021               Test Time:    16:17:37

Pat Name:     ISSA RODRIGUEZ             Department:   

Patient ID:   [id number]           Room:         105 A

Gender:       F                        Technician:   

:          1950               Requested By: KELLY TRUJILLO

Order Number: [id number]            Reading MD:     

                                 Measurements

Intervals                              Axis          

Rate:         142                      P:            -65

NC:           100                      QRS:          -22

QRSD:         80                       T:            120

QT:           306                                    

QTc:          471                                    

                           Interpretive Statements

SUPRAVENTRICULAR TACHYCARDIA

LEFTWARD AXIS

LOW LIMB LEAD VOLTAGE

CONSIDER LEFT VENTRICULAR HYPERTROPHY

QRS(T) CONTOUR ABNORMALITY

CONSISTENT WITH INFERIOR INFARCT

PROBABLY OLD

ST & T ABNORMALITY, CONSIDER

ANTEROLATERAL ISCHEMIA OR LEFT VENTRICULAR STRAIN

ABNORMAL ECG

RI6.01

No previous ECG available for comparison

## 2021-04-05 NOTE — RAD
Study: XR CHEST 1V



Indication: Tachycardia.



Comparison: 4/5/2021



Findings:



Right-sided PICC tip projecting within the mid to distal SVC.



The cardiomediastinal silhouette and juan are within normal limits. No confluent airspace infiltrate 
or pneumothorax. There again may be a trace left pleural effusion with overlying volume loss.



Impression:



1. Right-sided PICC terminates within the mid to distal SVC.

2. No significant change from the same day comparison with redemonstration of a possible trace left p
leural effusion with mild overlying volume loss.



Electronically signed by: MONIQUE BARRERA MD (4/5/2021 4:43 PM) [facility]

## 2021-04-05 NOTE — NUR
Pt lethargic & confused in bed at change of shift. Pt was recently transferred over to ICU 
from [address] [place] [address], for AMS, Afib with RVR (rates now 120-150's) and hypotension (66/46). Pt has 
been given Digoxin 0.5mg IVP, Lopressor 2.5mg IVP & Magnesium Sulfate 2 grams IV by previous 
RN with little improvement. Pt now anxious, pulling at cords/wires, O2 tubing and lines. Dr. Hernandez called for new orders and also paged [facility] (spoke with Dr Maya). Pt 
started on Levo gtt per orders, with improved BP (102/77, 123/88, 105/55). Pt was seen by 
wound care - recommend tx to [facility]C for debridement once stable.  Garland Aaron called 
and given update, prefers we call his cell # [phone]. Dr Hernandez called again and 
given update on status-plan for [facility]C transfer in AM since BP had improved on Levo gtt 
currently. Pt refusing any PO intake, all PO meds held. TPN started per orders, glucose 
check was 174. Pt TQ2 with purple wedge, specialty bed on order.

## 2021-04-05 NOTE — NUR
NURSING NOTE TRANSFER TO ICU

Pt heart rate has been steady at 120's then increased to 140 to 150 attempted to give oral 
metoprolol pt would not swallow and spit medication out. Dr gloria called orders obtained 
for chest xray EKG and consult cardiology. consult cardiology called. Pt heart rate then 
continued to increase to 160 to 170 dr gloria called orders received for digoxin 0.5mg IV 
push and a 500 mL bolus of D5W. ICU nurse here to assist at bed side . Pt heart rate 
continue to increase to 200 dr gloria notified with orders to move to ICU report given to 
Nohemy TALAMANTES. upon reviewing chart pt had orders for NS bolus in ED that could not be confirmed 
if it was given prior to arrival to unit.

## 2021-04-05 NOTE — NUR
Procedure: 

Following complete explanation of the PICC procedure including the indications, risks, and 
potential complications to the spouse, Garland Maria over the phone  informed consent was 
obtaine with two nurses each obtaining agreed consent from the spouse. 

The possibility for infection was discussed along with signs, symptoms, and prevention. All 
the spouses questions were answered.

IV Device Protocol was used.



Written and verbal patient education was provided. 



Hand hygiene performed. 



Standardized central line checklist was utilized. 



The patient was placed in the supine position, the right arm was prepped with chlorhexidine 
and patient draped with maximum sterile barrier.  1.5 mL 1% lidocaine was infiltrated into 
the skin to provide local anesthesia.  A thorough assessment of right upper extremity 
completed.  Using real-time ultrasound guidance and standardized micro puncture set, the 
Basilic vein was punctured and a peel away sheath was placed using the modified Seldinger 
technique. A tip location device was used to ensure adequate catheter placement.





The catheter was secured using a securement device and an antimicrobial patch was applied 
directly on the insertion site followed by a transparent dressing.  All ports withdraw blood 
and flush without resistance.





Patient tolerated the procedure without apparent complication. A dual Lumen Power PICC 
placement successful and uncomplicated.  Placement verified by EKG tip confirmation system 
with green p wave and rizwana observed.  Tip located in the low SVC per 3CG



Complications:

None

## 2021-04-05 NOTE — NUR
This RN spoke with pt's ER RN from this morning. Pt did not receive the 1 liter of LR bolus 
in ER. They were addressing pt's hypoglycemia.

## 2021-04-05 NOTE — NUR
Wound/Ostomy Care



Wound Type/Assessment:  

Patient seen per wound care consult. See wound assessment. Patient is well known to us from 
previous admissions at [facility]. Patient comes to [facility] with multiple wounds, 
decreased LOC, mental status change. Patient is in significant amount of pain. Patient 
transferred to the ICU prior to our assessment. Spoke with RN and RN stated patient is 
stable enough for wound care assessment at this time. During this assessment there is a 
total of 12 wounds on this patient. Patient has stage III pressure ulcer to coccyx, and 
unstageable pressure ulcers to the left ischium, right buttock, and left buttock. The right 
buttock wound is large and fluctuant with a depth of 1.4cm. There are two wounds to the 
abdomen one midline and one proximal which appear to be old puncture drain sites. there is 
6.0cm depth to the midline wound. The remaining wounds are open blisters to the left lateral 
knee, left posterior lower leg, right lateral thigh, right medial knee, right hip, and right 
posterior calf. All wounds are cleansed, assessed, and measured. 



Treatment Recommendations/Plan:  

Recommendations for honey alginate and foam dressings to the coccyx, left lateral knee, left 
posterior lower leg, right lateral thigh, right medial knee, right hip, left ischium, right 
buttock, right posterior lower calf, and left buttock. recommendations for Aquacel Ag and 
foam dressing to the the midline abdomen and proximal abdomen. All dressings applied. 



Education provided: 

Unable to educate patient at this time due to mental status change and current patient 
condition. 



Offloading surface/device: 

Patient is currently on an ICU bed and patient repositioned to left side using purple wedge. 
Patient should be turned every 2 hours. 



Recommended Referrals/Tests: 

Recommending consulting our wound care NP Mirella Wilson if Dr. Hernandez is agreeable.  



Discharge Recommendations for dressings: 

Dressing change instructions left in room. Extra honey alginate left in room for next 
dressing change as well. Wound care will follow up on 4/13/21 and possibly again this week 
if needed. Bed lowered and call light in reach and bed alarm in place.

## 2021-04-05 NOTE — RAD
CT HEAD AND C-SPINE WO



History: Reason: altered mental status, H/O OSTEOARTHRITIS, DDD. / Spl. Instructions:  / History: 



Comparison: December 29, 2017



Technique: Noncontrast CT imaging was performed of the head and cervical spine.  Coronal and sagittal
 reconstructions were performed.



Exposure: One or more of the following individualized dose reduction techniques were utilized for thi
s examination:  

1. Automated exposure control  

2. Adjustment of the mA and/or kV according to patient size  

3. Use of iterative reconstruction technique.



Findings: Motion degraded evaluation.

Head CT: No intracranial hemorrhage. No mass effect. Mild prominence of the lateral ventricles, uncha
nged and likely related to central brain parenchymal volume loss.



Mild brain parenchymal volume loss. Mild foci of decreased attenuation within the hemispheric white m
atter, most often due to chronic microvascular ischemia. Right inferior basal ganglia prominent periv
ascular space.



Imaged orbits are unremarkable. Imaged paranasal sinuses and mastoid air cells are clear. No acute ca
lvarial fracture.



Cervical spine CT:

Slight retrolisthesis C3 on C4 and C4 on C5. Normal vertebral body height. No fracture.



Moderate degenerative disc changes most prominent C3-C4 and C4-C5 as well as C6-C7. Multilevel facet 
arthropathy. Advanced right C1-C2 degenerative changes. No high-grade canal narrowing. Multilevel junaid
roforaminal narrowing.



Basilar impression, unchanged.



Sclerotic lesion within C4 right facet, likely bone island.



Soft tissues unremarkable.



Impression:

Head CT:

1.  Motion degraded evaluation. No definite acute intracranial abnormality. If persistent clinical co
ncern, recommend follow-up.



Cervical spine CT:

1.  No acute fracture or subluxation of the cervical spine.

2.  Moderate multilevel cervical spondylosis.



Electronically signed by: Heladio Rossi DO (4/5/2021 6:00 AM) [id number]

## 2021-04-06 VITALS — DIASTOLIC BLOOD PRESSURE: 68 MMHG | SYSTOLIC BLOOD PRESSURE: 123 MMHG

## 2021-04-06 VITALS — SYSTOLIC BLOOD PRESSURE: 107 MMHG | DIASTOLIC BLOOD PRESSURE: 51 MMHG

## 2021-04-06 VITALS — SYSTOLIC BLOOD PRESSURE: 113 MMHG | DIASTOLIC BLOOD PRESSURE: 74 MMHG

## 2021-04-06 VITALS — DIASTOLIC BLOOD PRESSURE: 51 MMHG | SYSTOLIC BLOOD PRESSURE: 105 MMHG

## 2021-04-06 VITALS — SYSTOLIC BLOOD PRESSURE: 64 MMHG | DIASTOLIC BLOOD PRESSURE: 46 MMHG

## 2021-04-06 VITALS — DIASTOLIC BLOOD PRESSURE: 51 MMHG | SYSTOLIC BLOOD PRESSURE: 83 MMHG

## 2021-04-06 VITALS — SYSTOLIC BLOOD PRESSURE: 94 MMHG | DIASTOLIC BLOOD PRESSURE: 58 MMHG

## 2021-04-06 VITALS — DIASTOLIC BLOOD PRESSURE: 85 MMHG | SYSTOLIC BLOOD PRESSURE: 112 MMHG

## 2021-04-06 VITALS — DIASTOLIC BLOOD PRESSURE: 50 MMHG | SYSTOLIC BLOOD PRESSURE: 110 MMHG

## 2021-04-06 VITALS — DIASTOLIC BLOOD PRESSURE: 51 MMHG | SYSTOLIC BLOOD PRESSURE: 81 MMHG

## 2021-04-06 VITALS — DIASTOLIC BLOOD PRESSURE: 51 MMHG | SYSTOLIC BLOOD PRESSURE: 100 MMHG

## 2021-04-06 VITALS — SYSTOLIC BLOOD PRESSURE: 75 MMHG | DIASTOLIC BLOOD PRESSURE: 49 MMHG

## 2021-04-06 VITALS — SYSTOLIC BLOOD PRESSURE: 87 MMHG | DIASTOLIC BLOOD PRESSURE: 58 MMHG

## 2021-04-06 VITALS — DIASTOLIC BLOOD PRESSURE: 49 MMHG | SYSTOLIC BLOOD PRESSURE: 69 MMHG

## 2021-04-06 VITALS — SYSTOLIC BLOOD PRESSURE: 105 MMHG | DIASTOLIC BLOOD PRESSURE: 59 MMHG

## 2021-04-06 VITALS — SYSTOLIC BLOOD PRESSURE: 83 MMHG | DIASTOLIC BLOOD PRESSURE: 46 MMHG

## 2021-04-06 VITALS — SYSTOLIC BLOOD PRESSURE: 51 MMHG | DIASTOLIC BLOOD PRESSURE: 35 MMHG

## 2021-04-06 VITALS — DIASTOLIC BLOOD PRESSURE: 55 MMHG | SYSTOLIC BLOOD PRESSURE: 93 MMHG

## 2021-04-06 VITALS — DIASTOLIC BLOOD PRESSURE: 53 MMHG | SYSTOLIC BLOOD PRESSURE: 96 MMHG

## 2021-04-06 VITALS — DIASTOLIC BLOOD PRESSURE: 78 MMHG | SYSTOLIC BLOOD PRESSURE: 162 MMHG

## 2021-04-06 VITALS — DIASTOLIC BLOOD PRESSURE: 66 MMHG | SYSTOLIC BLOOD PRESSURE: 91 MMHG

## 2021-04-06 VITALS — SYSTOLIC BLOOD PRESSURE: 100 MMHG | DIASTOLIC BLOOD PRESSURE: 48 MMHG

## 2021-04-06 LAB
% BANDS: 33 % (ref 0–9)
% LYMPHS: 2 % (ref 24–48)
% METAS: 3 % (ref 0–0)
% MONOS: 1 % (ref 0–10)
% MYELOS: 2 % (ref 0–0)
% SEGS: 59 % (ref 35–66)
ALBUMIN SERPL-MCNC: 1 G/DL (ref 3.4–5)
ALBUMIN/GLOB SERPL: 0.3 {RATIO} (ref 1–1.7)
ALP SERPL-CCNC: 267 U/L (ref 46–116)
ALT SERPL-CCNC: 68 U/L (ref 14–59)
ANION GAP SERPL CALC-SCNC: 11 MMOL/L (ref 6–14)
AST SERPL-CCNC: 97 U/L (ref 15–37)
BASOPHILS # BLD AUTO: 0 X10^3/UL (ref 0–0.2)
BASOPHILS NFR BLD: 0 % (ref 0–3)
BGAS PCO2: 41 MMHG (ref 35–45)
BGAS PH: 7.21 (ref 7.35–7.45)
BGAS PO2: 230 MMHG (ref 80–100)
BILIRUB SERPL-MCNC: 0.8 MG/DL (ref 0.2–1)
BUN/CREAT SERPL: 11 (ref 6–20)
CA-I SERPL ISE-MCNC: 16 MG/DL (ref 7–20)
CALCIUM PTH: 12.2 MG/DL (ref 8.7–10.3)
CALCIUM SERPL-MCNC: 11.6 MG/DL (ref 8.5–10.1)
CHLORIDE SERPL-SCNC: 99 MMOL/L (ref 98–107)
CO2 SERPL-SCNC: 22 MMOL/L (ref 21–32)
CREAT SERPL-MCNC: 1.5 MG/DL (ref 0.6–1)
CREATININE PTH: 1.1 MG/DL (ref 0.57–1)
DELTA BASE BGAS: -12 MMOL/L (ref 0–3)
EOSINOPHIL NFR BLD: 0.5 X10^3/UL (ref 0–0.7)
EOSINOPHIL NFR BLD: 2 % (ref 0–3)
ERYTHROCYTE [DISTWIDTH] IN BLOOD BY AUTOMATED COUNT: 19.2 % (ref 11.5–14.5)
GFR SERPLBLD BASED ON 1.73 SQ M-ARVRAT: 34.4 ML/MIN
GLOBULIN SER-MCNC: 3 G/DL (ref 2.2–3.8)
GLUCOSE SERPL-MCNC: 177 MG/DL (ref 70–99)
HCT VFR BLD CALC: 37.1 % (ref 36–47)
HGB BLD-MCNC: 11.7 G/DL (ref 12–15.5)
LYMPHOCYTES # BLD: 0.7 X10^3/UL (ref 1–4.8)
LYMPHOCYTES NFR BLD AUTO: 3 % (ref 24–48)
MCH RBC QN AUTO: 26 PG (ref 25–35)
MCHC RBC AUTO-ENTMCNC: 32 G/DL (ref 31–37)
MCV RBC AUTO: 82 FL (ref 79–100)
MONO #: 0.3 X10^3/UL (ref 0–1.1)
MONOCYTES NFR BLD: 1 % (ref 0–9)
NEUT #: 23.7 X10^3UL (ref 1.8–7.7)
NEUTROPHILS NFR BLD AUTO: 94 % (ref 31–73)
O2 SAT BGAS: 100 % (ref 92–99)
O2/TOTAL GAS SETTING VFR VENT: 100 %
PLATELET # BLD AUTO: 420 X10^3/UL (ref 140–400)
PLATELET # BLD EST: ADEQUATE 10*3/UL
POTASSIUM SERPL-SCNC: 3.7 MMOL/L (ref 3.5–5.1)
PROT SERPL-MCNC: 4 G/DL (ref 6.4–8.2)
PTH-INTACT SERPL-MCNC: 10 PG/ML (ref 15–65)
RBC # BLD AUTO: 4.54 X10^6/UL (ref 3.5–5.4)
SODIUM SERPL-SCNC: 132 MMOL/L (ref 136–145)
WBC # BLD AUTO: 25.1 X10^3/UL (ref 4–11)

## 2021-04-06 PROCEDURE — 5A1935Z RESPIRATORY VENTILATION, LESS THAN 24 CONSECUTIVE HOURS: ICD-10-PCS | Performed by: FAMILY MEDICINE

## 2021-04-06 PROCEDURE — 0BH18EZ INSERTION OF ENDOTRACHEAL AIRWAY INTO TRACHEA, VIA NATURAL OR ARTIFICIAL OPENING ENDOSCOPIC: ICD-10-PCS | Performed by: FAMILY MEDICINE

## 2021-04-06 RX ADMIN — DICLOFENAC SODIUM SCH APP: 10 GEL TOPICAL at 09:00

## 2021-04-06 RX ADMIN — NIACIN SCH MG: 500 TABLET, FILM COATED, EXTENDED RELEASE ORAL at 09:00

## 2021-04-06 RX ADMIN — PIPERACILLIN SODIUM AND TAZOBACTAM SODIUM SCH MLS/HR: 4; .5 INJECTION, POWDER, LYOPHILIZED, FOR SOLUTION INTRAVENOUS at 04:38

## 2021-04-06 RX ADMIN — IPRATROPIUM BROMIDE AND ALBUTEROL SULFATE SCH ML: .5; 3 SOLUTION RESPIRATORY (INHALATION) at 05:31

## 2021-04-06 RX ADMIN — Medication SCH MG: at 09:00

## 2021-04-06 RX ADMIN — DEXTROSE SCH MLS/HR: 5 SOLUTION INTRAVENOUS at 08:03

## 2021-04-06 RX ADMIN — AMIODARONE HYDROCHLORIDE PRN MLS/HR: 50 INJECTION, SOLUTION INTRAVENOUS at 03:38

## 2021-04-06 RX ADMIN — METOPROLOL TARTRATE SCH MG: 50 TABLET, FILM COATED ORAL at 09:00

## 2021-04-06 RX ADMIN — DEXTROSE SCH MLS/HR: 5 SOLUTION INTRAVENOUS at 00:25

## 2021-04-06 RX ADMIN — BUDESONIDE SCH MG: 0.5 SUSPENSION RESPIRATORY (INHALATION) at 08:00

## 2021-04-06 RX ADMIN — CHOLESTYRAMINE SCH GM: 4 POWDER, FOR SUSPENSION ORAL at 09:00

## 2021-04-06 RX ADMIN — ENOXAPARIN SODIUM SCH MG: 100 INJECTION SUBCUTANEOUS at 10:00

## 2021-04-06 RX ADMIN — AMIODARONE HYDROCHLORIDE PRN MLS/HR: 50 INJECTION, SOLUTION INTRAVENOUS at 09:45

## 2021-04-06 RX ADMIN — LIDOCAINE SCH PATCH: 50 PATCH CUTANEOUS at 09:00

## 2021-04-06 RX ADMIN — VITAMIN D, TAB 1000IU (100/BT) SCH UNIT: 25 TAB at 09:00

## 2021-04-06 RX ADMIN — POTASSIUM CHLORIDE SCH MEQ: 1500 TABLET, EXTENDED RELEASE ORAL at 09:00

## 2021-04-06 NOTE — DS
DATE OF DISCHARGE:  04/06/2021



HOSPITAL COURSE:  A 71-year-old female who was admitted for change in mental

status, had decreased blood sugar and also was noted to be septic, placed on

Zosyn and gentamicin and continue to be monitored carefully.  She was also

placed on a Levophed drip.  Blood pressure did come up, was feeling a little bit

better, was getting ready to be transferred down to [place] when the patient

went into ventricular fibrillation and acute respiratory distress.  The code

blue was called and along with Dr. Wright, Emergency Room physician, she

conducted the code blue.  The patient was intubated, stabilized and actually was

brought out of her ventricular fibrillation into a sinus rhythm with a steady

blood pressure approximately 117/60.  The patient had good urinary output.  She

never really did regain consciousness.  She had excellent oxygen saturation as

she was placed on the vent, intubated by Dr. Wright.  The patient was stabilized

and a discussion was made with the  and decided to go ahead and transfer

her down to [facility] the ICU there for continued care.  She had

been seen prior to this event by Cardiology in consultation and agreed with

therapy.  The patient, as noted, was stabilized on a ventilator and had sinus

rhythm at the time of transfer down to [facility].  Report was

given.



IMPRESSION:  Acute sepsis, urinary tract infection with yeast, bacteremia,

septic shock, acute respiratory failure, ventricular fibrillation, hypokalemia,

hypomagnesemia, change in mental status, hypoglycemia, type 2 diabetes,

respiratory acidosis, elevated liver enzymes, severe protein malnutrition.  The

patient, as noted, was transferred via EMS to physicians down there at the ICU

for further evaluation of this critically ill individual.





______________________________

KELLY TRUJILLO MD



DR:  HAILEY/yuridia  JOB#:  [id number] / [id number]

DD:  04/06/2021 18:10  DT:  04/06/2021 19:01

## 2021-04-06 NOTE — NUR
requested to withold duoneb due to patients high heart rate in the evening of 4/5 and in the 
early 

AM of 4/6/21

## 2021-04-06 NOTE — NUR
Pt slept for about 5 hrs during night. Pt remained lethargic & confused the remainder of 
shift when awake. Pt was given Digoxin 500mcg IVP around 0500 for sustained elevated HR 
150's, back down to 110-120's after dose. Pt still on Levo gtt per orders, currently running 
at 0.3mcg/kg/min.  Garland called this AM for update, would like to be notified when 
Dr Hernandez makes rounds on pt, his cell # [phone]. Pt TQ2 with purple wedge. Oral 
care performed.

## 2021-04-06 NOTE — EKG
[facility] [address], [place] [address]

Test Date:    2021               Test Time:    04:34:44

Pat Name:     ISSA RODRIGUEZ             Department:   

Patient ID:   [id number]           Room:          

Gender:       F                        Technician:   

:          1950               Requested By: YEE CANADA

Order Number: [id number]            Reading MD:     

                                 Measurements

Intervals                              Axis          

Rate:         127                      P:            -52

VT:           120                      QRS:          -24

QRSD:         66                       T:            92

QT:           336                                    

QTc:          494                                    

                           Interpretive Statements

SINUS TACHYCARDIA

LEFTWARD AXIS

LOW VOLTAGE

QRS(T) CONTOUR ABNORMALITY

CONSISTENT WITH ANTEROSEPTAL INFARCT

AGE UNDETERMINED

CONSISTENT WITH INFERIOR INFARCT

PROBABLY OLD

ABNORMAL ECG

RI6.02

No previous ECG available for comparison

## 2021-04-06 NOTE — PDOC
PROVIDER NOTE


Responded to a CODE BLUE in the ICU.  According to report patient was admitted 

for sepsis and bradycardia down into ventricular fibrillation.  Upon my arrival 

patient had received epinephrine once and had been shocked once.





Upon arrival patient is in ventricular fibrillation.  She was defibrillated and 

compressions were continued.  Patient was intubated with glide scope.  Patient 

received multiple shocks due to ventricular fibrillation.  She received 

epinephrine, amiodarone, lidocaine, sodium bicarb, calcium, magnesium.  Patient 

did have a brief 10-second episode where she had a pulse before then radiating 

down into ventricular fibrillation again.  After several rounds of compressions 

ROSC was obtained.  Epinephrine drip was ordered and started.  ABG was done 

which shows a pH of 7.22.  Sodium bicarb drip is not currently indicated.  

Oxygen was turned down on the ventilator given her P O2 of greater than 200.  

Plan is to transfer patient to [facility] for higher level of 

care.  Dr. Hernandez was at bedside during part of the cardiac arrest and has 

talked to the patient's .  Further care will be deferred to inpatient 

physicians.





Intubation Performed by: Jacque Wright MD


 Consent: Verbal consent not obtained. The procedure was performed in an 

emergent situation. 


Required items: required blood products, implants, devices, and special 

equipment available 


Patient identity confirmed: arm band 


Time out: Immediately prior to procedure a "time out" was called to verify the 

correct patient, procedure, equipment, support staff and site/side marked as 

required. 


Indications: respiratory failure and airway protection 


Intubation method: direct glide scope


Patient status: paralyzed (RSI) 


Preoxygenation: Bag-valve-mask


Laryngoscope size: Mac 4 


Tube size: 7.5 mm 


Tube type: cuffed 


Number of attempts: 1 


Cords visualized: yes 


Post-procedure assessment: chest rise, BS = bilaterally none over epigastrum, 

+CO2 detector 


Breath sounds: equal and absent over the epigastrium 


Cuff inflated: yes 


Tube secured with: adhesive tape 


Chest x-ray interpreted by me. Chest x-ray findings: endotracheal tube in 

appropriate position 


Patient tolerance: Patient tolerated the procedure well with no immediate 

complications.





Justification of Admission:


Justification of Admission:


Justification of Admission Dx:  Yes











JACQUE WRIGHT MD             Apr 6, 2021 16:05

## 2021-04-06 NOTE — NUR
0900 Monitor alarms v-fib, nurse check on pt, pt not breathing, pale, no pulse. CODE BLUE 
sounded, CPR started immediately. See Code BLUE charting. 

0911 Pt intubated.

0930 Pulse returns, ST on monitor-initiated bed placement at [facility]. Multiple 
medications given-see EMAr and CODE BLUE charting. 

0938 Pt placed on ventilator. 

0935 Pt  Garland arrives at bedside, very tearful. Dr. Hernandez spoke with him 
regarding transfer and plan of care,  agrees. 

1030 Pt report given to ALBIN Torres at [facility]. 

1045 EMS arrives for pt, drips including Mag, Levo, Epi and Amio running. 

1100 Updated ALBIN Torres that pt has left building and Radiology noted ET slightly in R Main 
Stem.

## 2021-04-06 NOTE — RAD
EXAM:  XR CHEST 1V 4/6/2021 9:09 AM



CLINICAL INDICATION:  Shortness of breath, CODE BLUE



COMPARISON:  Chest radiograph 4/5/2021



TECHNIQUE:  AP view of the chest



FINDINGS:  There is a new endotracheal tube with tip low in position at the origin of the right ganesh
tem bronchus. Recommend retracting approximately 3 cm. A right PICC is unchanged with tip over the neil
perior vena cava. The heart,. There is increased central vascular prominence and new mild opacities i
n the right upper lobe. Unchanged opacities and/or small effusion at the left lung base. No pneumotho
rax. No acute osseous abnormality.



IMPRESSION:  

1. Endotracheal tube low in position with tip at the origin of the right mainstem bronchus. Recommend
 retracting approximately 2-3 cm.

2. New central vascular congestion and mild opacities in the right upper lobe. Unchanged left basilar
 opacities and possible small left pleural effusion.





**********FOR INTERNAL CODING PURPOSES**********



Critical result:



Findings discussed with  the ICU nurse at [facility] at 4/6/2021 10:44 AM. She reported that t
he patient was transferred to [facility] and that she would call the report to the ICU
 nurse there.



RESULT CODE: (C)  



  







Electronically signed by: Etta Alaniz MD (4/6/2021 10:45 AM) [id number]

## 2021-04-06 NOTE — PDOC2
CARDIAC CONSULT


DATE OF CONSULT


DOS:


DATE: 4/6/21 


TIME: 08:08





REASON FOR CONSULT


Reason for Consult


Tachycardia





REFERRING PHYSICIAN


Referring Physician


Dr. Hernandze





SOURCE


Source:  Chart review





HPI


History of Present Illness


This is a 72 yo female who presented secondary to altered mental status. EMS 

noted blood glucose of 46. Mentation improved with 15 g of oral glucose. Blood 

sugar recheck just prior to arrival and was back down to 50. Additional glucose 

was administered. Patient presently nonresponsive. On pressor support. RN having

difficulty obtaining blood pressure. BC + for GPC. Appears AFIB on tele with a 

rate of 120. Received IV Dig this morning around 4:50am.





PAST MEDICAL HISTORY


Cardiovascular:  AFIB, HTN, hyperipidemia, Other (POTS, endocarditis remote; 

lymphedema))


Pulmonary:  Asthma, Other (URSZULA)


CENTRAL NERVOUS SYSTEM:  Seizure


GI:  GI bleed, Other (diverticulitis )


Heme/Onc:  Anemia NOS, Other ([place]brand )


Musculoskeletal:  Osteoarthritis


Rheumatologic:  Fibromyalgia





PAST SURGICAL HISTORY


Past Surgical History:  Colectomy





FAMILY HISTORY


Family History:  Other (dementia )





SOCIAL HISTORY


Smoke:  No


ALCOHOL:  none


Drugs:  None


Lives:  Nursing Home





CURRENT MEDICATIONS


Current Medications





Current Medications


Dextrose (Dextrose 50%-Water Syringe) 25 gm STK-MED ONCE IV ;  Start 4/5/21 at 

04:29;  Stop 4/5/21 at 04:29;  Status DC


Lactated Ringer's 1,000 ml @  100 mls/hr Q10H IV ;  Start 4/5/21 at 04:45;  Stop

4/5/21 at 09:41;  Status DC


Dextrose (Dextrose 50%-Water Syringe) 25 gm 1X  ONCE IV  Last administered on 

4/5/21at 04:45;  Start 4/5/21 at 04:45;  Stop 4/5/21 at 04:46;  Status DC


Glucose (Insta-Glucose) 15 gm 1X  ONCE PO  Last administered on 4/5/21at 05:49; 

Start 4/5/21 at 04:45;  Stop 4/5/21 at 04:46;  Status DC


Ondansetron HCl (Zofran) 4 mg PRN Q4HRS  PRN IVP NAUSEA/VOMITING;  Start 4/5/21 

at 05:45;  Stop 4/6/21 at 05:44;  Status DC


Acetaminophen (Tylenol) 650 mg PRN Q4HRS  PRN PO FEVER > 100.3'F;  Start 4/5/21 

at 05:45;  Stop 4/5/21 at 09:33;  Status DC


Albuterol/ Ipratropium (Duoneb) 3 ml RTQID NEB  Last administered on 4/5/21at 

16:00;  Start 4/5/21 at 08:00;  Stop 4/6/21 at 07:59;  Status DC


Sodium Chloride 1,000 ml @  160 mls/hr CONT  PRN IV CHART VIA I/O;  Start 4/5/21

at 05:45


Cholestyramine Resin (Questran Light) 4 gm DAILY PO ;  Start 4/5/21 at 09:30


Furosemide (Lasix) 40 mg WEEKLY PO ;  Start 4/12/21 at 09:00;  Stop 4/5/21 at 

14:36;  Status DC


Levetiracetam (Keppra) 500 mg BID PO ;  Start 4/5/21 at 09:30;  Stop 4/5/21 at 

12:24;  Status DC


Lidocaine (Lidoderm) 1 patch DAILY TD  Last administered on 4/5/21at 13:22;  

Start 4/5/21 at 09:30


Metoprolol Tartrate (Lopressor) 50 mg BID PO  Last administered on 4/5/21at 

15:58;  Start 4/5/21 at 16:00


Potassium Chloride (Klor-Con) 20 meq BID PO ;  Start 4/5/21 at 09:30


Zolpidem Tartrate (Ambien) 5 mg HS PO ;  Start 4/5/21 at 21:00;  Stop 4/5/21 at 

09:33;  Status DC


Vitamin D (Vitamin D3) 1,000 unit DAILY PO ;  Start 4/5/21 at 09:30


Diclofenac Sodium (Voltaren) 75 mg BID PO ;  Start 4/5/21 at 09:30;  Stop 4/5/21

at 09:28;  Status DC


Niacin (Slo-Niacin) 500 mg DAILY PO ;  Start 4/5/21 at 09:30


Fish Oil (Fish Oil) 2,000 mg DAILY PO ;  Start 4/5/21 at 09:30


Budesonide (Pulmicort) 0.5 mg RTBID NEB  Last administered on 4/5/21at 19:58;  

Start 4/5/21 at 10:00


Piperacillin Sod/ Tazobactam Sod (Zosyn Per Pharmacy) 1 each PRN DAILY  PRN MC 

SEE COMMENTS;  Start 4/5/21 at 09:15


Gentamicin Sulfate 1 each PRN DAILY  PRN MC SEE COMMENTS Last administered on 

4/5/21at 13:47;  Start 4/5/21 at 09:15


Albuterol Sulfate (Ventolin Hfa Inhaler) 2 puff PRN Q6HRS  PRN INH soa;  Start 

4/5/21 at 09:15;  Stop 4/5/21 at 09:41;  Status DC


Acetaminophen (Tylenol) 500 mg PRN Q6HRS  PRN PO MILD PAIN / TEMP > 100.3'F;  

Start 4/5/21 at 09:30


Tramadol HCl (Ultram) 50 mg PRN Q6HRS  PRN PO PAIN LEVEL 4-7;  Start 4/5/21 at 

09:30


Zolpidem Tartrate (Ambien) 5 mg PRN QHS  PRN PO INSOMNIA;  Start 4/5/21 at 09:30


Acetaminophen/ Hydrocodone Bitart (Lortab 5/325) 1 tab PRN Q6HRS  PRN PO PAIN 

>7;  Start 4/5/21 at 09:30


Piperacillin Sod/ Tazobactam Sod 4.5 gm/Sodium Chloride 50 ml @  100 mls/hr Q8H 

IV  Last administered on 4/5/21at 10:23;  Start 4/5/21 at 10:00;  Stop 4/5/21 at

12:58;  Status DC


Albuterol Sulfate (Ventolin) 2.5 mg PRN Q6HRS  PRN NEB SHORTNESS OF BREATH;  

Start 4/5/21 at 09:45


Fentanyl Citrate (Fentanyl 2ml Vial) 25 mcg PRN Q2HR  PRN IVP PAIN IF UNABLE TO 

TAKE PO Last administered on 4/6/21at 01:41;  Start 4/5/21 at 09:45


Gentamicin Sulfate 330 mg/ Sodium Chloride 108.25 ml  @ 108.25 mls/hr Q36H IV  

Last administered on 4/5/21at 13:36;  Start 4/5/21 at 11:00


Enoxaparin Sodium (Lovenox 40mg Syringe) 40 mg Q24H SQ  Last administered on 

4/5/21at 13:40;  Start 4/5/21 at 10:00


Albumin Human 250 ml @  62.5 mls/hr 1X  ONCE IV  Last administered on 4/5/21at 

13:40;  Start 4/5/21 at 10:00;  Stop 4/5/21 at 13:59;  Status DC


Dextrose 1,000 ml @  150 mls/hr Q6H40M IV  Last administered on 4/5/21at 18:00; 

Start 4/5/21 at 10:00


Levetiracetam 500 mg/Sodium Chloride 100 ml @  400 mls/hr Q12HR IV  Last 

administered on 4/5/21at 21:02;  Start 4/5/21 at 21:00


Piperacillin Sod/ Tazobactam Sod 4.5 gm/Sodium Chloride 50 ml @  100 mls/hr Q8H 

IV  Last administered on 4/6/21at 04:38;  Start 4/5/21 at 21:00


Info (Tpn Per Pharmacy) 1 each PRN DAILY  PRN MC SEE COMMENTS;  Start 4/5/21 at 

14:30


Sodium Chloride 90 meq/Potassium Chloride 60 meq/ Potassium Phosphate 13.6 

mmol/Magnesium Sulfate 10 meq/ Calcium Gluconate 10 meq/ Multivitamins/ Minerals

10 ml/ Zinc/Copper/ Manganese/ Selenium 1 ml/ Total Parenteral Nutrition/Amino 

Acids/Dextrose/ Fat Emulsion Intravenous 1,660 ml @  69.167 mls/ hr TPN  CONT IV

 Last administered on 4/5/21at 22:40;  Start 4/5/21 at 22:00;  Stop 4/6/21 at 

21:59


Digoxin (Lanoxin) 500 mcg 1X  ONCE IV  Last administered on 4/5/21at 16:25;  

Start 4/5/21 at 16:15;  Stop 4/5/21 at 16:16;  Status DC


Dextrose 500 ml @ 0 mls/hr 1X  ONCE IV  Last administered on 4/5/21at 16:15;  

Start 4/5/21 at 16:15;  Stop 4/5/21 at 16:16;  Status DC


Metoprolol Tartrate (Lopressor Vial) 5 mg STK-MED ONCE .ROUTE ;  Start 4/5/21 at

16:48;  Stop 4/5/21 at 16:49;  Status DC


Magnesium Sulfate 50 ml @ As Directed STK-MED ONCE IV ;  Start 4/5/21 at 17:17; 

Stop 4/5/21 at 17:17;  Status DC


Magnesium Sulfate 50 ml @ 25 mls/hr 1X  ONCE IV  Last administered on 4/5/21at 

17:30;  Start 4/5/21 at 17:45;  Stop 4/5/21 at 19:44;  Status DC


Metoprolol Tartrate (Lopressor Vial) 2.5 mg 1X  ONCE IV  Last administered on 

4/5/21at 17:15;  Start 4/5/21 at 17:45;  Stop 4/5/21 at 17:48;  Status DC


Micafungin Sodium (Mycamine) 100 mg AFTRNOON IV ;  Start 4/6/21 at 13:00;  

Status UNV


Diclofenac Sodium (Voltaren) 1 seth TID TP ;  Start 4/5/21 at 21:00


Norepinephrine Bitartrate 8 mg/ Dextrose 258 ml @  16.099 mls/ hr CONT  PRN IV 

SEE I/O RECORD Last administered on 4/6/21at 03:38;  Start 4/5/21 at 20:00


Micafungin Sodium 100 mg/Dextrose 100 ml @  100 mls/hr Q24H IV ;  Start 4/6/21 

at 13:00


Norepinephrine Bitartrate (Levophed) 4 mg STK-MED ONCE IV ;  Start 4/6/21 at 

02:50;  Stop 4/6/21 at 02:51;  Status DC


Digoxin (Lanoxin) 500 mcg 1X  ONCE IV  Last administered on 4/6/21at 04:51;  

Start 4/6/21 at 04:45;  Stop 4/6/21 at 04:48;  Status DC


Digoxin (Lanoxin) 500 mcg STK-MED ONCE .ROUTE ;  Start 4/6/21 at 04:48;  Stop 

4/6/21 at 04:49;  Status DC





Active Scripts


Active


Reported


Protonix (Pantoprazole Sodium) 40 Mg Tablet. 1 Tab PO DAILY


Multivitamins (Multivitamin) 1 Each Tablet 1 Tab PO DAILY


Montelukast Sodium Tablet ** (Montelukast Sodium) 10 Mg Tablet 10 Mg PO HS


Mirtazapine 15 Mg Tablet 1 Tab PO QHS


Micafungin (Micafungin Sodium) 100 Mg Vial 100 Mg IV AFTRNOON


Meropenem 500 Mg Vial 500 Mg IV Q6HRS


Magnesium Oxide 400 Mg Tablet 1 Tab PO BID


Ativan  ** (Lorazepam) 1 Mg Tablet 0.5 Mg PO PRN Q6HRS PRN


Loratadine 10 Mg Tab.rapdis 1 Tab PO DAILY 30 Days


Hydrocodone-Apap 5-325  ** (Hydrocodone Bit/Acetaminophen) 1 Each Tablet 1 Tab 

PO PRN Q4HRS PRN


Heparin 5,000 Unit/1,000 ml-Ns (Heparin Sod,Porcine/0.9 % NaCl) 5,000 Unit/1000 

Ml Iv.soln 5,000 Unit SQ TID


Diclofenac Sodium 100 Gm Gel..gram. 100 Gm TP TID


Aspirin 81 Mg Tab.chew 81 Mg PO DAILY


Acetaminophen 325 Mg Tablet 2 Tab PO PRN DAILY PRN 30 Days


Klor-Con M20 (Potassium Chloride) 20 Meq Tab.er.prt 20 Meq PO DAILY


Keppra (Levetiracetam) 500 Mg Tablet 500 Mg PO BID


Lasix (Furosemide) 40 Mg Tablet 20 Mg PO DAILY


     Indication: Diuretic


     Next dose: one time weekly


Lidoderm (Lidocaine) 700 Mg Adh..patch   


     Indication: pain patch


     Last Dose: 05/25/14


     Next Dose: 05/26/14


Metoprolol Tartrate 50 Mg Tablet 1 Tab PO TID


     Indication: Blood pressure


     Last dose: 05/25/14 in AM


     Next Dose: 05/25 at bedtime


Fish Oil + Vitamin D-3 Softgel (Om-3/Dha/Epa/Fish Oil/Vit D3) 1 Each Capsule 2 

Tab PO DAILY


     Indication: Heart health


     Last Dose: 05/25/14


     Next Dose: 05/26/14 in AM





ALLERGIES


Allergies:  


Coded Allergies:  


     Iodinated Contrast Media (Verified  Allergy, Severe, 5/24/14)


     iodine (Verified  Allergy, Severe, Anaphylaxis, 5/24/14)


     vancomycin (Verified  Allergy, Severe, 5/24/14)


     bacitracin (Verified  Allergy, Intermediate, 12/28/17)


     levofloxacin (Verified  Allergy, Intermediate, 12/28/17)


     shellfish derived (Verified  Allergy, Intermediate, 12/28/17)





ROS


Review of Systems


unobtainable





PHYSICAL EXAM


General:  No acute distress


HEENT:  Atraumatic


Lungs:  Other (dimimished bases)


Heart:  Other (AFIB, rate 120)


Abdomen:  Normal bowel sounds


Extremities:  Other (chronic LE lymphedema. Extremities cool to touch)


Neuro:  Other (somnolent )


Psych/Mental Status:  Other (unable to assess)


MUSCULOSKELETAL:  Osteoarthritic changes both hands





VITALS


Vital Signs





Vital Signs








  Date Time  Temp Pulse Resp B/P (MAP) Pulse Ox O2 Delivery O2 Flow Rate FiO2


 


4/6/21 06:20  113 22 112/85 (94) 98 Nasal Cannula 2.0 


 


4/6/21 05:35 98.4       











LABS


LABS





Laboratory Tests








Test


 4/5/21


04:32 4/5/21


04:47 4/5/21


05:13 4/5/21


06:20


 


Glucose (Fingerstick)


 41 mg/dL


(70-99) 


 91 mg/dL


(70-99) 80 mg/dL


(70-99)


 


White Blood Count


 


 9.4 x10^3/uL


(4.0-11.0) 


 





 


Red Blood Count


 


 4.35 x10^6/uL


(3.50-5.40) 


 





 


Hemoglobin


 


 11.1 g/dL


(12.0-15.5) 


 





 


Hematocrit


 


 36.0 %


(36.0-47.0) 


 





 


Mean Corpuscular Volume  83 fL ()   


 


Mean Corpuscular Hemoglobin  26 pg (25-35)   


 


Mean Corpuscular Hemoglobin


Concent 


 31 g/dL


(31-37) 


 





 


Red Cell Distribution Width


 


 19.8 %


(11.5-14.5) 


 





 


Platelet Count


 


 462 x10^3/uL


(140-400) 


 





 


Neutrophils (%) (Auto)  35 % (31-73)   


 


Lymphocytes (%) (Auto)  50 % (24-48)   


 


Monocytes (%) (Auto)  8 % (0-9)   


 


Eosinophils (%) (Auto)  5 % (0-3)   


 


Basophils (%) (Auto)  1 % (0-3)   


 


Neutrophils # (Auto)


 


 3.3 x10^3uL


(1.8-7.7) 


 





 


Lymphocytes # (Auto)


 


 4.7 x10^3/uL


(1.0-4.8) 


 





 


Monocytes # (Auto)


 


 0.8 x10^3/uL


(0.0-1.1) 


 





 


Eosinophils # (Auto)


 


 0.5 x10^3/uL


(0.0-0.7) 


 





 


Basophils # (Auto)


 


 0.1 x10^3/uL


(0.0-0.2) 


 





 


Prothrombin Time


 


 11.4 SEC


(9.4-11.4) 


 





 


Prothromb Time International


Ratio 


 1.1 (0.9-1.1) 


 


 





 


Activated Partial


Thromboplast Time 


 33 SEC (23-33) 


 


 





 


Sodium Level


 


 146 mmol/L


(136-145) 


 





 


Potassium Level


 


 4.1 mmol/L


(3.5-5.1) 


 





 


Chloride Level


 


 111 mmol/L


() 


 





 


Carbon Dioxide Level


 


 28 mmol/L


(21-32) 


 





 


Anion Gap  7 (6-14)   


 


Blood Urea Nitrogen


 


 14 mg/dL


(7-20) 


 





 


Creatinine


 


 1.3 mg/dL


(0.6-1.0) 


 





 


Estimated GFR


(Cockcroft-Gault) 


 40.6 


 


 





 


Glucose Level


 


 58 mg/dL


(70-99) 


 





 


Lactic Acid Level


 


 2.2 mmol/L


(0.4-2.0) 


 





 


Calcium Level


 


 12.5 mg/dL


(8.5-10.1) 


 





 


Magnesium Level


 


 1.5 mg/dL


(1.8-2.4) 


 





 


Total Bilirubin


 


 0.5 mg/dL


(0.2-1.0) 


 





 


Direct Bilirubin


 


 0.2 mg/dL


(0.0-0.2) 


 





 


Aspartate Amino Transf


(AST/SGOT) 


 184 U/L


(15-37) 


 





 


Alanine Aminotransferase


(ALT/SGPT) 


 102 U/L


(14-59) 


 





 


Alkaline Phosphatase


 


 407 U/L


() 


 





 


Creatine Kinase


 


 21 U/L


() 


 





 


Troponin I Quantitative


 


 < 0.017 ng/mL


(0-0.055) 


 





 


C-Reactive Protein


 


 93.3 mg/L


(0-3.3) 


 





 


NT-Pro-B-Type Natriuretic


Peptide 


 832 pg/mL


(0-124) 


 





 


Total Protein


 


 5.1 g/dL


(6.4-8.2) 


 





 


Albumin


 


 1.2 g/dL


(3.4-5.0) 


 





 


Lipase


 


 71 U/L


() 


 





 


Thyroid Stimulating Hormone


(TSH) 


 2.288 uIU/mL


(0.358-3.740) 


 





 


Test


 4/5/21


07:05 4/5/21


07:50 4/5/21


08:30 4/5/21


10:23


 


Urine Collection Type Unknown    


 


Urine Color Yellow    


 


Urine Clarity Clear    


 


Urine pH 5.5    


 


Urine Specific Gravity >=1.030    


 


Urine Protein


 100 mg/dl


(NEG-TRACE) 


 


 





 


Urine Glucose (UA)


 Neg mg/dL


(NEG) 


 


 





 


Urine Ketones (Stick)


 Trace mg/dL


(NEG) 


 


 





 


Urine Blood Large (NEG)    


 


Urine Nitrite Neg (NEG)    


 


Urine Bilirubin Neg (NEG)    


 


Urine Urobilinogen Dipstick


 0.2 mg/dL (0.2


mg/dL) 


 


 





 


Urine Leukocyte Esterase Trace (NEG)    


 


Urine RBC


 11-20 /HPF


(0-2) 


 


 





 


Urine WBC >40 /HPF (0-4)    


 


Urine Squamous Epithelial


Cells Few /LPF 


 


 


 





 


Urine Bacteria 0 /HPF (0-FEW)    


 


Urine Hyaline Casts Occ /HPF    


 


Urine Mucus Slight /LPF    


 


Urine Yeast Present /HPF    


 


Glucose (Fingerstick)


 83 mg/dL


(70-99) 82 mg/dL


(70-99) 


 91 mg/dL


(70-99)


 


Urine Opiates Screen Neg (NEG)    


 


Urine Methadone Screen Neg (NEG)    


 


Urine Barbiturates Neg (NEG)    


 


Urine Phencyclidine Screen Neg (NEG)    


 


Urine


Amphetamine/Methamphetamine Neg (NEG) 


 


 


 





 


Urine Benzodiazepines Screen Neg (NEG)    


 


Urine Cocaine Screen Neg (NEG)    


 


Urine Cannabinoids Screen Neg (NEG)    


 


Urine Ethyl Alcohol Neg (NEG)    


 


Lactic Acid Level


 


 


 2.5 mmol/L


(0.4-2.0) 





 


Troponin I Quantitative


 


 


 < 0.017 ng/mL


(0-0.055) 





 


Test


 4/5/21


11:17 4/5/21


11:39 4/5/21


14:13 4/5/21


15:23


 


Ionized Calcium


 1.71 mmol/L


(1.13-1.32) 


 


 





 


Troponin I Quantitative


 < 0.017 ng/mL


(0-0.055) 


 


 





 


Glucose (Fingerstick)


 


 76 mg/dL


(70-99) 86 mg/dL


(70-99) 86 mg/dL


(70-99)


 


Test


 4/5/21


15:50 4/5/21


16:15 4/5/21


18:38 4/5/21


22:51


 


White Blood Count


 5.5 x10^3/uL


(4.0-11.0) 


 


 





 


Red Blood Count


 3.81 x10^6/uL


(3.50-5.40) 


 


 





 


Hemoglobin


 9.9 g/dL


(12.0-15.5) 


 


 





 


Hematocrit


 30.9 %


(36.0-47.0) 


 


 





 


Mean Corpuscular Volume 81 fL ()    


 


Mean Corpuscular Hemoglobin 26 pg (25-35)    


 


Mean Corpuscular Hemoglobin


Concent 32 g/dL


(31-37) 


 


 





 


Red Cell Distribution Width


 19.2 %


(11.5-14.5) 


 


 





 


Platelet Count


 391 x10^3/uL


(140-400) 


 


 





 


Neutrophils (%) (Auto) 80 % (31-73)    


 


Lymphocytes (%) (Auto) 17 % (24-48)    


 


Monocytes (%) (Auto) 1 % (0-9)    


 


Eosinophils (%) (Auto) 1 % (0-3)    


 


Basophils (%) (Auto) 1 % (0-3)    


 


Neutrophils # (Auto)


 4.4 x10^3uL


(1.8-7.7) 


 


 





 


Lymphocytes # (Auto)


 0.9 x10^3/uL


(1.0-4.8) 


 


 





 


Monocytes # (Auto)


 0.1 x10^3/uL


(0.0-1.1) 


 


 





 


Eosinophils # (Auto)


 0.0 x10^3/uL


(0.0-0.7) 


 


 





 


Basophils # (Auto)


 0.1 x10^3/uL


(0.0-0.2) 


 


 





 


Sodium Level


 147 mmol/L


(136-145) 


 


 





 


Potassium Level


 3.2 mmol/L


(3.5-5.1) 


 


 





 


Chloride Level


 110 mmol/L


() 


 


 





 


Carbon Dioxide Level


 30 mmol/L


(21-32) 


 


 





 


Anion Gap 7 (6-14)    


 


Blood Urea Nitrogen


 15 mg/dL


(7-20) 


 


 





 


Creatinine


 1.3 mg/dL


(0.6-1.0) 


 


 





 


Estimated GFR


(Cockcroft-Gault) 40.6 


 


 


 





 


BUN/Creatinine Ratio 12 (6-20)    


 


Glucose Level


 90 mg/dL


(70-99) 


 


 





 


Calcium Level


 12.2 mg/dL


(8.5-10.1) 


 


 





 


Total Bilirubin


 0.6 mg/dL


(0.2-1.0) 


 


 





 


Aspartate Amino Transf


(AST/SGOT) 130 U/L


(15-37) 


 


 





 


Alanine Aminotransferase


(ALT/SGPT) 82 U/L (14-59) 


 


 


 





 


Alkaline Phosphatase


 330 U/L


() 


 


 





 


Total Protein


 4.7 g/dL


(6.4-8.2) 


 


 





 


Albumin


 1.4 g/dL


(3.4-5.0) 


 


 





 


Albumin/Globulin Ratio 0.4 (1.0-1.7)    


 


Glucose (Fingerstick)


 


 89 mg/dL


(70-99) 84 mg/dL


(70-99) 174 mg/dL


(70-99)


 


Test


 4/6/21


05:23 4/6/21


06:15 


 





 


Glucose (Fingerstick)


 173 mg/dL


(70-99) 


 


 





 


White Blood Count


 


 25.1 x10^3/uL


(4.0-11.0) 


 





 


Red Blood Count


 


 4.54 x10^6/uL


(3.50-5.40) 


 





 


Hemoglobin


 


 11.7 g/dL


(12.0-15.5) 


 





 


Hematocrit


 


 37.1 %


(36.0-47.0) 


 





 


Mean Corpuscular Volume  82 fL ()   


 


Mean Corpuscular Hemoglobin  26 pg (25-35)   


 


Mean Corpuscular Hemoglobin


Concent 


 32 g/dL


(31-37) 


 





 


Red Cell Distribution Width


 


 19.2 %


(11.5-14.5) 


 





 


Platelet Count


 


 420 x10^3/uL


(140-400) 


 





 


Neutrophils (%) (Auto)  94 % (31-73)   


 


Lymphocytes (%) (Auto)  3 % (24-48)   


 


Monocytes (%) (Auto)  1 % (0-9)   


 


Eosinophils (%) (Auto)  2 % (0-3)   


 


Basophils (%) (Auto)  0 % (0-3)   


 


Neutrophils # (Auto)


 


 23.7 x10^3uL


(1.8-7.7) 


 





 


Lymphocytes # (Auto)


 


 0.7 x10^3/uL


(1.0-4.8) 


 





 


Monocytes # (Auto)


 


 0.3 x10^3/uL


(0.0-1.1) 


 





 


Eosinophils # (Auto)


 


 0.5 x10^3/uL


(0.0-0.7) 


 





 


Basophils # (Auto)


 


 0.0 x10^3/uL


(0.0-0.2) 


 





 


Segmented Neutrophils %  59 % (35-66)   


 


Band Neutrophils %  33 % (0-9)   


 


Lymphocytes %  2 % (24-48)   


 


Monocytes %  1 % (0-10)   


 


Metamyelocytes %  3 % (0-0)   


 


Myelocytes %  2 % (0-0)   


 


Platelet Estimate


 


 Adequate


(ADEQUATE) 


 














ECHOCARDIOGRAM


Echocardiogram


<Conclusion>


Technically difficult study.


Left ventricle systolic function is normal.


The Ejection Fraction is 55-60%.


There is normal LV segmental wall motion.


Transmitral Doppler flow pattern is Grade I-abnormal relaxation pattern.


Doppler and Color Flow revealed trace tricuspid regurgitation.


The PA pressure was estimated at 20 mmHg.


There is no evidence of significant pericardial effusion.





DATE:     01/02/18 0902








<Conclusion>


The left ventricular systolic function is normal.


The Ejection Fraction is 55-60%.


Transmitral Doppler flow pattern is Grade I-abnormal relaxation pattern.


Trace tricuspid regurgitation.


The PA pressure was estimated at 29 mmHg.


There is no evidence of significant pericardial effusion.





DATE:     02/09/21 [id number]





ASSESSMENT/PLAN


Assessment/Plan


1.  Leukocytosis, lactic acidosis, sepsis. BC + GPC. as per IM


2.  Septic shock; requiring pressor support. difficulty obtaining blood pressure

despite use of doppler 


3.  Paroxysmal AFIB/flutter; presently AFIB with intermittent RVR in setting of 

above. S/p IV Dig. Recent echo with preserved LV systolic function. Follows with

Dr. Chirag MORENO


4.  H/o sigmoid diverticulitis with severe adhesions and colonic stricture; s/p 

recent colectomy with colostomy/BEVERLY-BSO


5.  H/o recent GIB 


6.  Postoperative pelvic abscess; s/p ALLYN drain 2/12/2021


7.  Transaminits 


8.  Hypercalcemia


9.  Hypomagnesemia 


10. Anemia: Hgb stable at 8.6


11.  Known hx of orthostasis and POTS


12.  Hyperlipidmeia 


13.  Obesity


14.  Protein calorie malnutrition 








Recommendations 





Hold metoprolol with hypotension


Will use Dig for rate control for now


Pressor support 


Will need art line for pressure monitoring 


Recheck Mg and replace as warranted


No OAC given recent GIB 


Critically ill; recommend transfer to [facility] as she will need

multidisciplinary care











VIKY MAURER            Apr 6, 2021 08:18